# Patient Record
Sex: FEMALE | Race: WHITE | NOT HISPANIC OR LATINO
[De-identification: names, ages, dates, MRNs, and addresses within clinical notes are randomized per-mention and may not be internally consistent; named-entity substitution may affect disease eponyms.]

---

## 2022-11-02 ENCOUNTER — NON-APPOINTMENT (OUTPATIENT)
Age: 52
End: 2022-11-02

## 2023-05-06 ENCOUNTER — INPATIENT (INPATIENT)
Facility: HOSPITAL | Age: 53
LOS: 2 days | Discharge: HOME | DRG: 423 | End: 2023-05-09
Attending: INTERNAL MEDICINE | Admitting: INTERNAL MEDICINE
Payer: COMMERCIAL

## 2023-05-06 VITALS
RESPIRATION RATE: 20 BRPM | DIASTOLIC BLOOD PRESSURE: 69 MMHG | OXYGEN SATURATION: 99 % | HEART RATE: 84 BPM | HEIGHT: 67 IN | TEMPERATURE: 98 F | SYSTOLIC BLOOD PRESSURE: 140 MMHG | WEIGHT: 190.04 LBS

## 2023-05-06 DIAGNOSIS — K80.50 CALCULUS OF BILE DUCT WITHOUT CHOLANGITIS OR CHOLECYSTITIS WITHOUT OBSTRUCTION: ICD-10-CM

## 2023-05-06 LAB
ALBUMIN SERPL ELPH-MCNC: 3.8 G/DL — SIGNIFICANT CHANGE UP (ref 3.5–5.2)
ALP SERPL-CCNC: 247 U/L — HIGH (ref 30–115)
ALT FLD-CCNC: 555 U/L — HIGH (ref 0–41)
ANION GAP SERPL CALC-SCNC: 9 MMOL/L — SIGNIFICANT CHANGE UP (ref 7–14)
APTT BLD: 36 SEC — SIGNIFICANT CHANGE UP (ref 27–39.2)
AST SERPL-CCNC: 428 U/L — HIGH (ref 0–41)
BASOPHILS # BLD AUTO: 0.03 K/UL — SIGNIFICANT CHANGE UP (ref 0–0.2)
BASOPHILS NFR BLD AUTO: 0.4 % — SIGNIFICANT CHANGE UP (ref 0–1)
BILIRUB DIRECT SERPL-MCNC: 0.5 MG/DL — HIGH (ref 0–0.3)
BILIRUB INDIRECT FLD-MCNC: 0.5 MG/DL — SIGNIFICANT CHANGE UP (ref 0.2–1.2)
BILIRUB SERPL-MCNC: 1 MG/DL — SIGNIFICANT CHANGE UP (ref 0.2–1.2)
BUN SERPL-MCNC: 16 MG/DL — SIGNIFICANT CHANGE UP (ref 10–20)
CALCIUM SERPL-MCNC: 9.3 MG/DL — SIGNIFICANT CHANGE UP (ref 8.4–10.5)
CHLORIDE SERPL-SCNC: 105 MMOL/L — SIGNIFICANT CHANGE UP (ref 98–110)
CO2 SERPL-SCNC: 25 MMOL/L — SIGNIFICANT CHANGE UP (ref 17–32)
CREAT SERPL-MCNC: 0.7 MG/DL — SIGNIFICANT CHANGE UP (ref 0.7–1.5)
EGFR: 104 ML/MIN/1.73M2 — SIGNIFICANT CHANGE UP
EOSINOPHIL # BLD AUTO: 0.03 K/UL — SIGNIFICANT CHANGE UP (ref 0–0.7)
EOSINOPHIL NFR BLD AUTO: 0.4 % — SIGNIFICANT CHANGE UP (ref 0–8)
GLUCOSE BLDC GLUCOMTR-MCNC: 96 MG/DL — SIGNIFICANT CHANGE UP (ref 70–99)
GLUCOSE SERPL-MCNC: 254 MG/DL — HIGH (ref 70–99)
HCG SERPL QL: NEGATIVE — SIGNIFICANT CHANGE UP
HCT VFR BLD CALC: 42.5 % — SIGNIFICANT CHANGE UP (ref 37–47)
HGB BLD-MCNC: 14 G/DL — SIGNIFICANT CHANGE UP (ref 12–16)
IMM GRANULOCYTES NFR BLD AUTO: 0.4 % — HIGH (ref 0.1–0.3)
INR BLD: 1.11 RATIO — SIGNIFICANT CHANGE UP (ref 0.65–1.3)
LIDOCAIN IGE QN: 86 U/L — HIGH (ref 7–60)
LYMPHOCYTES # BLD AUTO: 1.77 K/UL — SIGNIFICANT CHANGE UP (ref 1.2–3.4)
LYMPHOCYTES # BLD AUTO: 22 % — SIGNIFICANT CHANGE UP (ref 20.5–51.1)
MCHC RBC-ENTMCNC: 30.6 PG — SIGNIFICANT CHANGE UP (ref 27–31)
MCHC RBC-ENTMCNC: 32.9 G/DL — SIGNIFICANT CHANGE UP (ref 32–37)
MCV RBC AUTO: 92.8 FL — SIGNIFICANT CHANGE UP (ref 81–99)
MONOCYTES # BLD AUTO: 0.7 K/UL — HIGH (ref 0.1–0.6)
MONOCYTES NFR BLD AUTO: 8.7 % — SIGNIFICANT CHANGE UP (ref 1.7–9.3)
NEUTROPHILS # BLD AUTO: 5.49 K/UL — SIGNIFICANT CHANGE UP (ref 1.4–6.5)
NEUTROPHILS NFR BLD AUTO: 68.1 % — SIGNIFICANT CHANGE UP (ref 42.2–75.2)
NRBC # BLD: 0 /100 WBCS — SIGNIFICANT CHANGE UP (ref 0–0)
PLATELET # BLD AUTO: 303 K/UL — SIGNIFICANT CHANGE UP (ref 130–400)
PMV BLD: 11.1 FL — HIGH (ref 7.4–10.4)
POTASSIUM SERPL-MCNC: 3.5 MMOL/L — SIGNIFICANT CHANGE UP (ref 3.5–5)
POTASSIUM SERPL-SCNC: 3.5 MMOL/L — SIGNIFICANT CHANGE UP (ref 3.5–5)
PROT SERPL-MCNC: 7.6 G/DL — SIGNIFICANT CHANGE UP (ref 6–8)
PROTHROM AB SERPL-ACNC: 12.7 SEC — SIGNIFICANT CHANGE UP (ref 9.95–12.87)
RBC # BLD: 4.58 M/UL — SIGNIFICANT CHANGE UP (ref 4.2–5.4)
RBC # FLD: 13.8 % — SIGNIFICANT CHANGE UP (ref 11.5–14.5)
SODIUM SERPL-SCNC: 139 MMOL/L — SIGNIFICANT CHANGE UP (ref 135–146)
WBC # BLD: 8.05 K/UL — SIGNIFICANT CHANGE UP (ref 4.8–10.8)
WBC # FLD AUTO: 8.05 K/UL — SIGNIFICANT CHANGE UP (ref 4.8–10.8)

## 2023-05-06 PROCEDURE — 99285 EMERGENCY DEPT VISIT HI MDM: CPT

## 2023-05-06 PROCEDURE — 80076 HEPATIC FUNCTION PANEL: CPT

## 2023-05-06 PROCEDURE — 85025 COMPLETE CBC W/AUTO DIFF WBC: CPT

## 2023-05-06 PROCEDURE — 88312 SPECIAL STAINS GROUP 1: CPT

## 2023-05-06 PROCEDURE — 86901 BLOOD TYPING SEROLOGIC RH(D): CPT

## 2023-05-06 PROCEDURE — 80048 BASIC METABOLIC PNL TOTAL CA: CPT

## 2023-05-06 PROCEDURE — 85730 THROMBOPLASTIN TIME PARTIAL: CPT

## 2023-05-06 PROCEDURE — 88184 FLOWCYTOMETRY/ TC 1 MARKER: CPT

## 2023-05-06 PROCEDURE — 88341 IMHCHEM/IMCYTCHM EA ADD ANTB: CPT

## 2023-05-06 PROCEDURE — 86850 RBC ANTIBODY SCREEN: CPT

## 2023-05-06 PROCEDURE — 88342 IMHCHEM/IMCYTCHM 1ST ANTB: CPT

## 2023-05-06 PROCEDURE — 88305 TISSUE EXAM BY PATHOLOGIST: CPT

## 2023-05-06 PROCEDURE — 80053 COMPREHEN METABOLIC PANEL: CPT

## 2023-05-06 PROCEDURE — 88172 CYTP DX EVAL FNA 1ST EA SITE: CPT

## 2023-05-06 PROCEDURE — C9113: CPT

## 2023-05-06 PROCEDURE — 76705 ECHO EXAM OF ABDOMEN: CPT | Mod: 26

## 2023-05-06 PROCEDURE — 87205 SMEAR GRAM STAIN: CPT

## 2023-05-06 PROCEDURE — 83036 HEMOGLOBIN GLYCOSYLATED A1C: CPT

## 2023-05-06 PROCEDURE — 99223 1ST HOSP IP/OBS HIGH 75: CPT

## 2023-05-06 PROCEDURE — 88173 CYTOPATH EVAL FNA REPORT: CPT

## 2023-05-06 PROCEDURE — 85027 COMPLETE CBC AUTOMATED: CPT

## 2023-05-06 PROCEDURE — 84100 ASSAY OF PHOSPHORUS: CPT

## 2023-05-06 PROCEDURE — 83735 ASSAY OF MAGNESIUM: CPT

## 2023-05-06 PROCEDURE — 88185 FLOWCYTOMETRY/TC ADD-ON: CPT

## 2023-05-06 PROCEDURE — 86900 BLOOD TYPING SEROLOGIC ABO: CPT

## 2023-05-06 PROCEDURE — 82962 GLUCOSE BLOOD TEST: CPT

## 2023-05-06 PROCEDURE — 36415 COLL VENOUS BLD VENIPUNCTURE: CPT

## 2023-05-06 PROCEDURE — 81025 URINE PREGNANCY TEST: CPT

## 2023-05-06 PROCEDURE — 85610 PROTHROMBIN TIME: CPT

## 2023-05-06 PROCEDURE — 74177 CT ABD & PELVIS W/CONTRAST: CPT | Mod: 26,MA

## 2023-05-06 RX ORDER — RANOLAZINE 500 MG/1
500 TABLET, FILM COATED, EXTENDED RELEASE ORAL
Refills: 0 | Status: DISCONTINUED | OUTPATIENT
Start: 2023-05-06 | End: 2023-05-09

## 2023-05-06 RX ORDER — GLUCAGON INJECTION, SOLUTION 0.5 MG/.1ML
1 INJECTION, SOLUTION SUBCUTANEOUS ONCE
Refills: 0 | Status: DISCONTINUED | OUTPATIENT
Start: 2023-05-06 | End: 2023-05-09

## 2023-05-06 RX ORDER — FAMOTIDINE 10 MG/ML
20 INJECTION INTRAVENOUS ONCE
Refills: 0 | Status: COMPLETED | OUTPATIENT
Start: 2023-05-06 | End: 2023-05-06

## 2023-05-06 RX ORDER — CHLORHEXIDINE GLUCONATE 213 G/1000ML
1 SOLUTION TOPICAL
Refills: 0 | Status: DISCONTINUED | OUTPATIENT
Start: 2023-05-06 | End: 2023-05-09

## 2023-05-06 RX ORDER — SODIUM CHLORIDE 9 MG/ML
1000 INJECTION INTRAMUSCULAR; INTRAVENOUS; SUBCUTANEOUS ONCE
Refills: 0 | Status: COMPLETED | OUTPATIENT
Start: 2023-05-06 | End: 2023-05-06

## 2023-05-06 RX ORDER — AMLODIPINE BESYLATE 2.5 MG/1
10 TABLET ORAL AT BEDTIME
Refills: 0 | Status: DISCONTINUED | OUTPATIENT
Start: 2023-05-06 | End: 2023-05-08

## 2023-05-06 RX ORDER — ONDANSETRON 8 MG/1
4 TABLET, FILM COATED ORAL EVERY 8 HOURS
Refills: 0 | Status: DISCONTINUED | OUTPATIENT
Start: 2023-05-06 | End: 2023-05-09

## 2023-05-06 RX ORDER — SODIUM CHLORIDE 9 MG/ML
1000 INJECTION, SOLUTION INTRAVENOUS
Refills: 0 | Status: DISCONTINUED | OUTPATIENT
Start: 2023-05-06 | End: 2023-05-09

## 2023-05-06 RX ORDER — CEFEPIME 1 G/1
2000 INJECTION, POWDER, FOR SOLUTION INTRAMUSCULAR; INTRAVENOUS EVERY 12 HOURS
Refills: 0 | Status: DISCONTINUED | OUTPATIENT
Start: 2023-05-06 | End: 2023-05-09

## 2023-05-06 RX ORDER — CEFOTETAN DISODIUM 1 G
1 VIAL (EA) INJECTION ONCE
Refills: 0 | Status: COMPLETED | OUTPATIENT
Start: 2023-05-06 | End: 2023-05-06

## 2023-05-06 RX ORDER — ONDANSETRON 8 MG/1
4 TABLET, FILM COATED ORAL ONCE
Refills: 0 | Status: COMPLETED | OUTPATIENT
Start: 2023-05-06 | End: 2023-05-06

## 2023-05-06 RX ORDER — LANOLIN ALCOHOL/MO/W.PET/CERES
3 CREAM (GRAM) TOPICAL AT BEDTIME
Refills: 0 | Status: DISCONTINUED | OUTPATIENT
Start: 2023-05-06 | End: 2023-05-09

## 2023-05-06 RX ORDER — ACETAMINOPHEN 500 MG
650 TABLET ORAL EVERY 6 HOURS
Refills: 0 | Status: DISCONTINUED | OUTPATIENT
Start: 2023-05-06 | End: 2023-05-09

## 2023-05-06 RX ORDER — PANTOPRAZOLE SODIUM 20 MG/1
40 TABLET, DELAYED RELEASE ORAL DAILY
Refills: 0 | Status: DISCONTINUED | OUTPATIENT
Start: 2023-05-06 | End: 2023-05-08

## 2023-05-06 RX ORDER — ATORVASTATIN CALCIUM 80 MG/1
40 TABLET, FILM COATED ORAL AT BEDTIME
Refills: 0 | Status: DISCONTINUED | OUTPATIENT
Start: 2023-05-06 | End: 2023-05-09

## 2023-05-06 RX ORDER — METRONIDAZOLE 500 MG
500 TABLET ORAL EVERY 8 HOURS
Refills: 0 | Status: DISCONTINUED | OUTPATIENT
Start: 2023-05-06 | End: 2023-05-09

## 2023-05-06 RX ORDER — DIPHENHYDRAMINE HYDROCHLORIDE AND LIDOCAINE HYDROCHLORIDE AND ALUMINUM HYDROXIDE AND MAGNESIUM HYDRO
30 KIT ONCE
Refills: 0 | Status: COMPLETED | OUTPATIENT
Start: 2023-05-06 | End: 2023-05-06

## 2023-05-06 RX ORDER — LOSARTAN POTASSIUM 100 MG/1
100 TABLET, FILM COATED ORAL DAILY
Refills: 0 | Status: DISCONTINUED | OUTPATIENT
Start: 2023-05-06 | End: 2023-05-08

## 2023-05-06 RX ORDER — INSULIN LISPRO 100/ML
VIAL (ML) SUBCUTANEOUS
Refills: 0 | Status: DISCONTINUED | OUTPATIENT
Start: 2023-05-06 | End: 2023-05-09

## 2023-05-06 RX ORDER — DEXTROSE 50 % IN WATER 50 %
25 SYRINGE (ML) INTRAVENOUS ONCE
Refills: 0 | Status: DISCONTINUED | OUTPATIENT
Start: 2023-05-06 | End: 2023-05-09

## 2023-05-06 RX ORDER — DEXTROSE 50 % IN WATER 50 %
15 SYRINGE (ML) INTRAVENOUS ONCE
Refills: 0 | Status: DISCONTINUED | OUTPATIENT
Start: 2023-05-06 | End: 2023-05-09

## 2023-05-06 RX ADMIN — SODIUM CHLORIDE 1000 MILLILITER(S): 9 INJECTION INTRAMUSCULAR; INTRAVENOUS; SUBCUTANEOUS at 13:00

## 2023-05-06 RX ADMIN — DIPHENHYDRAMINE HYDROCHLORIDE AND LIDOCAINE HYDROCHLORIDE AND ALUMINUM HYDROXIDE AND MAGNESIUM HYDRO 30 MILLILITER(S): KIT at 12:59

## 2023-05-06 RX ADMIN — AMLODIPINE BESYLATE 10 MILLIGRAM(S): 2.5 TABLET ORAL at 21:46

## 2023-05-06 RX ADMIN — FAMOTIDINE 100 MILLIGRAM(S): 10 INJECTION INTRAVENOUS at 13:01

## 2023-05-06 RX ADMIN — ATORVASTATIN CALCIUM 40 MILLIGRAM(S): 80 TABLET, FILM COATED ORAL at 21:46

## 2023-05-06 RX ADMIN — Medication 100 GRAM(S): at 16:57

## 2023-05-06 RX ADMIN — ONDANSETRON 4 MILLIGRAM(S): 8 TABLET, FILM COATED ORAL at 13:01

## 2023-05-06 RX ADMIN — RANOLAZINE 500 MILLIGRAM(S): 500 TABLET, FILM COATED, EXTENDED RELEASE ORAL at 21:46

## 2023-05-06 RX ADMIN — Medication 100 MILLIGRAM(S): at 21:46

## 2023-05-06 NOTE — CONSULT NOTE ADULT - SUBJECTIVE AND OBJECTIVE BOX
53 y/o F PMHx DM, HTN, GERD c/o 1 week h/o intermittent upper mid-abdominal pain and intermittent episodes of vomiting. First episode of vomiting was a week ago with small amount observed per pt; last episode of vomiting was this morning on an empty stomach.  Pt denies chest pain, fever, SOB, recent foreign travels, lightheadedness, dysuria, hematuria,  bloody stools    currently py denies any abd pain   never had gb attack   never had egd or colonoscopy     LBM this am and nl   no current gi doctor       Allergies and Intolerances:   No Known Allergies:     Home Medications:   · 	HYDROCHLOROT 25MG TAB: Last Dose Taken:    · 	LOSARTAN 100MG TAB: Last Dose Taken:    · 	PANTOPRAZOLE 40MG DR TAB: Last Dose Taken:    · 	AMLOD/ATORVA 10-40MG TAB: Last Dose Taken:    · 	RANOLAZINE 500MG ER TAB: Last Dose Taken:    · 	JANUMET 50-1,000 MG TABLET: Last Dose Taken:  , TAKE 1 TABLET BY MOUTH EVERY DAY  · 	TRESIBA FLEX 100U/ML PEN: Last Dose Taken:    · 	JARDIANCE 25 MG TABLET: Last Dose Taken:  , TAKE 1 TABLET BY MOUTH EVERY DAY  · 	TRULICITY 1.5 MG/0.5 ML PEN: Last Dose Taken:  , INJECT 0.5 ML SUBCUTANEOUSLY ONE TIME PER WEEK    Patient History:     Diabetes     GERD (gastroesophageal reflux disease)     Hypertension.     PAST SURGICAL HISTORY:  No significant past surgical history.        Social History:  pt denies h/o smoking  pt denies h/o alcohol use  pt denies h/o illicit drug use      Physical Exam: T(C): 36.9 (05-06-23 @ 12:34), Max: 36.9 (05-06-23 @ 12:34)  HR: 84 (05-06-23 @ 12:34) (84 - 84)  BP: 140/69 (05-06-23 @ 12:34) (140/69 - 140/69)  RR: 20 (05-06-23 @ 12:34) (20 - 20)  SpO2: 99% (05-06-23 @ 12:34) (99% - 99%)    PHYSICAL EXAM:  GENERAL: laying in bed, appearing comfortable and in NAD; non-toxic appearing  HEENT: Moist mucous membranes  NECK: Supple  CHEST/LUNG: even respirations b/l; no accessory muscle use  ABDOMEN: Soft, Nontender, Nondistended no rt   EXTREMITIES:   No calf TTP b/l  SKIN: warm  Neuro: A&Ox4       Labs and Results:  Labs, Radiology, Cardiology, and Other Results: 14.0   8.05  )-----------( 303      ( 06 May 2023 13:04 )             42.5       05-06    139  |  105  |  16  ----------------------------<  254<H>  3.5   |  25  |  0.7    Ca    9.3      06 May 2023 13:04    TPro  7.6  /  Alb  3.8  /  TBili  1.0  /  DBili  0.5<H>  /  AST  428<H>  /  ALT  555<H>  /  AlkPhos  247<H>  05-06            PT/INR - ( 06 May 2023 13:04 )   PT: 12.70 sec;   INR: 1.11 ratio         PTT - ( 06 May 2023 13:04 )  PTT:36.0 sec    EXAM:  CT ABDOMEN AND PELVIS PROCEDURE DATE:  05/06/2023      FINDINGS:  HEPATOBILIARY: Mild intra and extrahepatic biliary ductal dilatation,   with CBD measuring up to 1 cm and demonstrates an abrupt change of   caliber at its distal end. No definite choledocholithiasis identified.   Nonspecific gallbladder wall edema. Cholelithiasis. Right hepatic   hypodensity too small to characterize..    KIDNEYS: Symmetric enhancement. No hydronephrosis. Bilateral parapelvic   cysts. Nonobstructing 9 mm right lower pole calculus.    BONES/SOFT TISSUES: Degenerative changes of the spine. Small   fat-containing umbilical hernia.    IMPRESSION:  Mild intra and extrahepatic biliary ductal dilatation, with CBD measuring   up to 1 cm and demonstrates an abrupt change of caliber at its distal   end. No definite choledocholithiasis identified. Recommend MRCP for   better evaluation of the biliary tree.    Gallbladder wall edema, gallstone and inflammatory changes in the   gallbladder fossa suggesting acute cholecystitis. Confirmation with   abdominal ultrasound suggested.    --- End of Report ---    DORCAS KENNEY MD; Attending Radiologist  This document has been electronically signed. May  6 2023  3:57PM    US ABDOMEN RT UPR QUADRANT     PROCEDURE DATE:  05/06/2023      FINDINGS:  LIVER: Within normal limits.  BILE DUCTS: Mild intrahepatic biliary ductal dilatation. Common bile duct   measures 6 mm.  GALLBLADDER: Multiple gallstones, the largest measuring up to 0.7 x 0.7   cm. Gallbladder wall thickening up to 6 mm. Trace pericholecystic fluid.   Negative sonographic Coronel's sign.  PANCREAS: Visualized portions are within normal limits.  RIGHT KIDNEY: 12 cm. No hydronephrosis.  ASCITES: None.  IVC: Visualized portions are within normal limits.      IMPRESSION:  Gallstones and wall thickening with trace pericholecystic fluid. Reported   negative sonographic Coronel's sign. Findings can represent cholecystitis   in appropriate clinical setting.    --- End of Report --    DINESH NEWBY MD; Attending Radiologist  This document has been electronically signed. May  6 2023  4:36PM

## 2023-05-06 NOTE — H&P ADULT - HISTORY OF PRESENT ILLNESS
53 y/o F PMHx DM, HTN, GERD c/o 1 week h/o intermittent upper mid-abdominal pain and intermittent episodes of vomiting. First episode of vomiting was a week ago with small amount observed per pt; last episode of vomiting was this morning on an empty stomach.  Pt denies chest pain, fever, SOB, recent foreign travels, lightheadedness, dysuria, hematuria,  bloody stools   53 y/o F PMHx DM, HTN, GERD c/o 1 week h/o intermittent upper mid-abdominal pain and intermittent episodes of vomiting. First episode of vomiting was a week ago with small amount observed per pt; last episode of vomiting was this morning on an empty stomach.  Pt denies chest pain, fever, SOB, recent foreign travels, lightheadedness, dysuria, hematuria, bloody stools

## 2023-05-06 NOTE — H&P ADULT - NSHPLABSRESULTS_GEN_ALL_CORE
14.0   8.05  )-----------( 303      ( 06 May 2023 13:04 )             42.5       05-06    139  |  105  |  16  ----------------------------<  254<H>  3.5   |  25  |  0.7    Ca    9.3      06 May 2023 13:04    TPro  7.6  /  Alb  3.8  /  TBili  1.0  /  DBili  0.5<H>  /  AST  428<H>  /  ALT  555<H>  /  AlkPhos  247<H>  05-06                  PT/INR - ( 06 May 2023 13:04 )   PT: 12.70 sec;   INR: 1.11 ratio         PTT - ( 06 May 2023 13:04 )  PTT:36.0 sec          CAPILLARY BLOOD GLUCOSE 14.0   8.05  )-----------( 303      ( 06 May 2023 13:04 )             42.5       05-06    139  |  105  |  16  ----------------------------<  254<H>  3.5   |  25  |  0.7    Ca    9.3      06 May 2023 13:04    TPro  7.6  /  Alb  3.8  /  TBili  1.0  /  DBili  0.5<H>  /  AST  428<H>  /  ALT  555<H>  /  AlkPhos  247<H>  05-06            PT/INR - ( 06 May 2023 13:04 )   PT: 12.70 sec;   INR: 1.11 ratio         PTT - ( 06 May 2023 13:04 )  PTT:36.0 sec    EXAM:  CT ABDOMEN AND PELVIS PROCEDURE DATE:  05/06/2023      FINDINGS:  HEPATOBILIARY: Mild intra and extrahepatic biliary ductal dilatation,   with CBD measuring up to 1 cm and demonstrates an abrupt change of   caliber at its distal end. No definite choledocholithiasis identified.   Nonspecific gallbladder wall edema. Cholelithiasis. Right hepatic   hypodensity too small to characterize..    KIDNEYS: Symmetric enhancement. No hydronephrosis. Bilateral parapelvic   cysts. Nonobstructing 9 mm right lower pole calculus.    BONES/SOFT TISSUES: Degenerative changes of the spine. Small   fat-containing umbilical hernia.    IMPRESSION:  Mild intra and extrahepatic biliary ductal dilatation, with CBD measuring   up to 1 cm and demonstrates an abrupt change of caliber at its distal   end. No definite choledocholithiasis identified. Recommend MRCP for   better evaluation of the biliary tree.    Gallbladder wall edema, gallstone and inflammatory changes in the   gallbladder fossa suggesting acute cholecystitis. Confirmation with   abdominal ultrasound suggested.    --- End of Report ---    DORCAS KENNEY MD; Attending Radiologist  This document has been electronically signed. May  6 2023  3:57PM    US ABDOMEN RT UPR QUADRANT     PROCEDURE DATE:  05/06/2023      FINDINGS:  LIVER: Within normal limits.  BILE DUCTS: Mild intrahepatic biliary ductal dilatation. Common bile duct   measures 6 mm.  GALLBLADDER: Multiple gallstones, the largest measuring up to 0.7 x 0.7   cm. Gallbladder wall thickening up to 6 mm. Trace pericholecystic fluid.   Negative sonographic Coronel's sign.  PANCREAS: Visualized portions are within normal limits.  RIGHT KIDNEY: 12 cm. No hydronephrosis.  ASCITES: None.  IVC: Visualized portions are within normal limits.      IMPRESSION:  Gallstones and wall thickening with trace pericholecystic fluid. Reported   negative sonographic Coronel's sign. Findings can represent cholecystitis   in appropriate clinical setting.    --- End of Report --    DINESH NEWBY MD; Attending Radiologist  This document has been electronically signed. May  6 2023  4:36PM

## 2023-05-06 NOTE — ED PROVIDER NOTE - OBJECTIVE STATEMENT
52-year-old female past medical history of diabetes, hypertension, GERD presents for evaluation of vomiting.  Patient states she has been having episodes of indigestion over the past week with intermittent episodes of vomiting.  First episode a week ago and she noticed small amount of blood, second episode a couple days ago after eating and vomiting just included food contents, this morning with his most recent episode on an empty stomach.  She noted blood again.  Now presents with mild burning epigastric pain, no inciting or relieving factors.  Denies fever, headache, chest pain, shortness of breath, lightheadedness, dysuria, hematuria, dark or bloody stools.

## 2023-05-06 NOTE — ED PROVIDER NOTE - ATTENDING APP SHARED VISIT CONTRIBUTION OF CARE
Patient comes in for epigastric discomfort associated with some vomiting which today she saw little blood in.  Denies any fever or diarrhea.    Exam: Soft nontender nondistended abdomen, normal skin, no acute distress  Plan: Labs, antiemetic, p.o. challenge

## 2023-05-06 NOTE — H&P ADULT - ASSESSMENT
53 y/o F PMHx DM, HTN, GERD c/o 1 week h/o intermittent upper mid-abdominal pain and intermittent episodes of vomiting. First episode of vomiting was a week ago with small amount observed per pt; last episode of vomiting was this morning on an empty stomach.Pt denies chest pain, fever, SOB, recent foreign travels, lightheadedness, dysuria, hematuria, dark or bloody stools. In ED, labs revealed elevated LFTS, including elevated lipase and d. bili. Imaging demonstrated findings consistent w/ cholecystitis     Plan   Admit and transfer to [place]  #cholecystitis   #Gallstones and wall thickening with trace pericholecystic fluid on US  # elevated LFTs  -s/p 1 dose cefotetan in ED   - GI consult       HTN    #DM  - hold oral meds  -ISS  53 y/o F PMHx DM, HTN, GERD c/o 1 week h/o intermittent upper mid-abdominal pain and intermittent episodes of vomiting. First episode of vomiting was a week ago with small amount observed per pt; last episode of vomiting was this morning on an empty stomach.Pt denies chest pain, fever, SOB, recent foreign travels, lightheadedness, dysuria, hematuria, dark or bloody stools. In ED, labs revealed elevated LFTS, including elevated lipase and d. bili. Imaging demonstrated findings consistent w/ cholecystitis     Plan   Admit and transfer to [place]  #cholecystitis   #Gallstones and wall thickening with trace pericholecystic fluid on US  # elevated LFTs  -s/p 1 dose cefotetan in ED   - GI consult   - cardiology consult for clearance ( pt states she was placed on Ranexa 2 yrs ago after going to hospital for chest pain, but reports cardiac workup was negative, but her cardiologist Dr. Sullivan advised her to c/w Ranexa)    #HTN  -c/w home meds    #DM  - hold oral meds  -ISS       pt d/w Dr. Espinal 53 y/o F PMHx DM, HTN, GERD c/o 1 week h/o intermittent upper mid-abdominal pain and intermittent episodes of vomiting. First episode of vomiting was a week ago with small amount observed per pt; last episode of vomiting was this morning on an empty stomach. Pt reports abd pain is 1-2/10 when sxs occur (no known triggers). Pt denies chest pain, fever, SOB, recent foreign travels, lightheadedness, dysuria, hematuria, dark or bloody stools. In ED, labs revealed elevated LFTS, including elevated lipase and d. bili. Imaging demonstrated findings consistent w/ cholecystitis     Plan   Admit and transfer to [place]  #cholecystitis   #Gallstones and wall thickening with trace pericholecystic fluid seen on US  # elevated LFTs  - s/p 1 dose cefotetan in ED   - IV start cefepime and flagyl  - CLD  - GI consult for ERCP +/- MRCP  - cardiology consult for clearance ( pt states she was placed on Ranexa 2 yrs ago after going to hospital for chest pain, but reports cardiac workup was negative, but her cardiologist Dr. Sullivan advised her to c/w Ranexa)  - trend LFTs, lipase  - f/u blood CX    #HTN  -c/w home meds    #DM  - hold oral meds  -ISS     #GERD  - c/w pantoprazole          pt d/w Dr. Espinal 51 y/o F PMHx DM, HTN, GERD c/o 1 week h/o intermittent upper mid-abdominal pain and intermittent episodes of vomiting. First episode of vomiting was a week ago with small amount observed per pt; last episode of vomiting was this morning on an empty stomach. Pt reports abd pain is 1-2/10 when sxs occur (no known triggers). Pt denies chest pain, fever, SOB, recent foreign travels, lightheadedness, dysuria, hematuria, dark or bloody stools. In ED, labs revealed elevated LFTS, including elevated lipase and d. bili. Imaging demonstrated findings consistent w/ cholecystitis     Plan   Admit and transfer to [place]  #cholecystitis   #Gallstones and wall thickening with trace pericholecystic fluid seen on US  # elevated LFTs  - s/p 1 dose cefotetan in ED   - IV start cefepime and flagyl  - CLD  - GI consult for ERCP +/- MRCP  - cardiology consult for clearance ( pt states Ranexa started 2 yrs ago after going to hospital for chest pain, but reports cardiac workup was negative, but her cardiologist Dr. Sullivan advised her to c/w Ranexa)  - trend LFTs, lipase  - f/u blood Cx    #HTN  -c/w home meds    #DM  - hold meds   -ISS     #GERD  - c/w pantoprazole          pt d/w Dr. Espinal 53 y/o F PMHx DM, HTN, GERD c/o 1 week h/o intermittent upper mid-abdominal pain and intermittent episodes of vomiting. First episode of vomiting was a week ago with small amount observed per pt; last episode of vomiting was this morning on an empty stomach. Pt reports abd pain is 1-2/10 when sxs occur (no known triggers). Pt denies chest pain, fever, SOB, recent foreign travels, lightheadedness, dysuria, hematuria, dark or bloody stools. In ED, labs revealed elevated LFTS, including elevated lipase and d. bili. Imaging demonstrated findings consistent w/ cholecystitis     Plan   Admit to [place] from  ED  #cholecystitis   #Gallstones and wall thickening with trace pericholecystic fluid seen on US, CBD dilation 1 cm on CT ab  # elevated LFTs  - s/p 1 dose cefotetan in ED   - IV start cefepime and flagyl  - CLD  - GI consulted in ER at , very likely ERCP this Monday  - trend LFT  - f/u blood Cx    #HTN  -c/w home meds    #DM  - hold PO meds   -ISS     #GERD  - c/w pantoprazole        To Follow:  Need to order AM labs once arrives at [place], discuss with GI Dr. Kumari.    Of note, was placed on Ranexa by cardiologist; neg cardiac work up 2 years ago per patient and was told by cardiologist to continue Ranexa.  Currently asymptomatic from cardiopulmonary standpoint but would get collateral infro from Dr. Collado. 53 y/o F PMHx DM, HTN, GERD c/o 1 week h/o intermittent upper mid-abdominal pain and intermittent episodes of vomiting. First episode of vomiting was a week ago with small amount observed per pt; last episode of vomiting was this morning on an empty stomach. Pt reports abd pain is 1-2/10 when sxs occur (no known triggers). Pt denies chest pain, fever, SOB, recent foreign travels, lightheadedness, dysuria, hematuria, dark or bloody stools. In ED, labs revealed elevated LFTS, including elevated lipase and d. bili. Imaging demonstrated findings consistent w/ cholecystitis     Plan   Admit to [place] from  ED  #cholecystitis   #Gallstones and wall thickening with trace pericholecystic fluid seen on US, CBD dilation 1 cm on CT ab  # elevated LFTs  - s/p 1 dose cefotetan in ED   - start IV cefepime and flagyl  - CLD  - GI consulted in ER at , very likely ERCP this Monday  - trend LFT  - f/u blood Cx    #HTN  -c/w home meds    #DM  - hold PO meds   -ISS     #GERD  - c/w pantoprazole        To Follow:  Need to order AM labs once arrives at [place], discuss with GI Dr. Kumari.    Of note, was placed on Ranexa by cardiologist; neg cardiac work up 2 years ago per patient and was told by cardiologist to continue Ranexa.  Currently asymptomatic from cardiopulmonary standpoint but would get collateral infro from Dr. Collado.

## 2023-05-06 NOTE — ED ADULT NURSE REASSESSMENT NOTE - NS ED NURSE REASSESS COMMENT FT1
[facility] arrived for transport to [place] inpatient unit. Safety and comfort maintained.

## 2023-05-06 NOTE — ED PROVIDER NOTE - PROGRESS NOTE DETAILS
CT reviewed - benign exam persists - well appearing pt - given DM and transaminitis will get RUQ US and Surgery consult.  Surgery ACP aware. Case d/w GI /Brayden Marsh - plan for EUS/ERCP Monday.  D/w Hospitalist Dr. Espinal.

## 2023-05-06 NOTE — H&P ADULT - NSHPPHYSICALEXAM_GEN_ALL_CORE
T(C): 36.9 (05-06-23 @ 12:34), Max: 36.9 (05-06-23 @ 12:34)  HR: 84 (05-06-23 @ 12:34) (84 - 84)  BP: 140/69 (05-06-23 @ 12:34) (140/69 - 140/69)  RR: 20 (05-06-23 @ 12:34) (20 - 20)  SpO2: 99% (05-06-23 @ 12:34) (99% - 99%)    PHYSICAL EXAM:  GENERAL: laying in bed, appearing comfortable and in NAD  HEENT: Moist mucous membranes  NECK: Supple  CHEST/LUNG: even respirations b/l; no accessory muscle use  ABDOMEN: Soft, Nontender, Nondistended  EXTREMITIES:   No calf TTP b/l  SKIN: warm  Neuro: A&Ox4 T(C): 36.9 (05-06-23 @ 12:34), Max: 36.9 (05-06-23 @ 12:34)  HR: 84 (05-06-23 @ 12:34) (84 - 84)  BP: 140/69 (05-06-23 @ 12:34) (140/69 - 140/69)  RR: 20 (05-06-23 @ 12:34) (20 - 20)  SpO2: 99% (05-06-23 @ 12:34) (99% - 99%)    PHYSICAL EXAM:  GENERAL: laying in bed, appearing comfortable and in NAD; non-toxic appearing  HEENT: Moist mucous membranes  NECK: Supple  CHEST/LUNG: even respirations b/l; no accessory muscle use  ABDOMEN: Soft, Nontender, Nondistended  EXTREMITIES:   No calf TTP b/l  SKIN: warm  Neuro: A&Ox4

## 2023-05-07 LAB
ALBUMIN SERPL ELPH-MCNC: 3.3 G/DL — LOW (ref 3.5–5.2)
ALBUMIN SERPL ELPH-MCNC: 3.4 G/DL — LOW (ref 3.5–5.2)
ALP SERPL-CCNC: 168 U/L — HIGH (ref 30–115)
ALP SERPL-CCNC: 168 U/L — HIGH (ref 30–115)
ALT FLD-CCNC: 491 U/L — HIGH (ref 0–41)
ALT FLD-CCNC: 517 U/L — HIGH (ref 0–41)
ANION GAP SERPL CALC-SCNC: 11 MMOL/L — SIGNIFICANT CHANGE UP (ref 7–14)
ANION GAP SERPL CALC-SCNC: 11 MMOL/L — SIGNIFICANT CHANGE UP (ref 7–14)
AST SERPL-CCNC: 368 U/L — HIGH (ref 0–41)
AST SERPL-CCNC: 400 U/L — HIGH (ref 0–41)
BASOPHILS # BLD AUTO: 0.05 K/UL — SIGNIFICANT CHANGE UP (ref 0–0.2)
BASOPHILS NFR BLD AUTO: 0.9 % — SIGNIFICANT CHANGE UP (ref 0–1)
BILIRUB DIRECT SERPL-MCNC: 0.5 MG/DL — HIGH (ref 0–0.3)
BILIRUB INDIRECT FLD-MCNC: 0.7 MG/DL — SIGNIFICANT CHANGE UP (ref 0.2–1.2)
BILIRUB SERPL-MCNC: 1.2 MG/DL — SIGNIFICANT CHANGE UP (ref 0.2–1.2)
BILIRUB SERPL-MCNC: 1.2 MG/DL — SIGNIFICANT CHANGE UP (ref 0.2–1.2)
BUN SERPL-MCNC: 13 MG/DL — SIGNIFICANT CHANGE UP (ref 10–20)
BUN SERPL-MCNC: 14 MG/DL — SIGNIFICANT CHANGE UP (ref 10–20)
CALCIUM SERPL-MCNC: 9 MG/DL — SIGNIFICANT CHANGE UP (ref 8.4–10.5)
CALCIUM SERPL-MCNC: 9 MG/DL — SIGNIFICANT CHANGE UP (ref 8.4–10.5)
CHLORIDE SERPL-SCNC: 104 MMOL/L — SIGNIFICANT CHANGE UP (ref 98–110)
CHLORIDE SERPL-SCNC: 105 MMOL/L — SIGNIFICANT CHANGE UP (ref 98–110)
CHLORIDE SERPL-SCNC: 109 MMOL/L — SIGNIFICANT CHANGE UP (ref 98–110)
CO2 SERPL-SCNC: 23 MMOL/L — SIGNIFICANT CHANGE UP (ref 17–32)
CO2 SERPL-SCNC: 25 MMOL/L — SIGNIFICANT CHANGE UP (ref 17–32)
CREAT SERPL-MCNC: 0.7 MG/DL — SIGNIFICANT CHANGE UP (ref 0.7–1.5)
CREAT SERPL-MCNC: 0.7 MG/DL — SIGNIFICANT CHANGE UP (ref 0.7–1.5)
EGFR: 104 ML/MIN/1.73M2 — SIGNIFICANT CHANGE UP
EGFR: 104 ML/MIN/1.73M2 — SIGNIFICANT CHANGE UP
EOSINOPHIL # BLD AUTO: 0.03 K/UL — SIGNIFICANT CHANGE UP (ref 0–0.7)
EOSINOPHIL NFR BLD AUTO: 0.5 % — SIGNIFICANT CHANGE UP (ref 0–8)
GLUCOSE BLDC GLUCOMTR-MCNC: 111 MG/DL — HIGH (ref 70–99)
GLUCOSE BLDC GLUCOMTR-MCNC: 116 MG/DL — HIGH (ref 70–99)
GLUCOSE BLDC GLUCOMTR-MCNC: 132 MG/DL — HIGH (ref 70–99)
GLUCOSE BLDC GLUCOMTR-MCNC: 92 MG/DL — SIGNIFICANT CHANGE UP (ref 70–99)
GLUCOSE SERPL-MCNC: 88 MG/DL — SIGNIFICANT CHANGE UP (ref 70–99)
GLUCOSE SERPL-MCNC: 89 MG/DL — SIGNIFICANT CHANGE UP (ref 70–99)
HCT VFR BLD CALC: 37.9 % — SIGNIFICANT CHANGE UP (ref 37–47)
HCT VFR BLD CALC: 40 % — SIGNIFICANT CHANGE UP (ref 37–47)
HGB BLD-MCNC: 12.3 G/DL — SIGNIFICANT CHANGE UP (ref 12–16)
HGB BLD-MCNC: 13 G/DL — SIGNIFICANT CHANGE UP (ref 12–16)
IMM GRANULOCYTES NFR BLD AUTO: 0.2 % — SIGNIFICANT CHANGE UP (ref 0.1–0.3)
LYMPHOCYTES # BLD AUTO: 2.01 K/UL — SIGNIFICANT CHANGE UP (ref 1.2–3.4)
LYMPHOCYTES # BLD AUTO: 35.5 % — SIGNIFICANT CHANGE UP (ref 20.5–51.1)
MAGNESIUM SERPL-MCNC: 1.9 MG/DL — SIGNIFICANT CHANGE UP (ref 1.8–2.4)
MCHC RBC-ENTMCNC: 30.6 PG — SIGNIFICANT CHANGE UP (ref 27–31)
MCHC RBC-ENTMCNC: 30.6 PG — SIGNIFICANT CHANGE UP (ref 27–31)
MCHC RBC-ENTMCNC: 32.5 G/DL — SIGNIFICANT CHANGE UP (ref 32–37)
MCHC RBC-ENTMCNC: 32.5 G/DL — SIGNIFICANT CHANGE UP (ref 32–37)
MCV RBC AUTO: 94.1 FL — SIGNIFICANT CHANGE UP (ref 81–99)
MCV RBC AUTO: 94.3 FL — SIGNIFICANT CHANGE UP (ref 81–99)
MONOCYTES # BLD AUTO: 0.54 K/UL — SIGNIFICANT CHANGE UP (ref 0.1–0.6)
MONOCYTES NFR BLD AUTO: 9.5 % — HIGH (ref 1.7–9.3)
NEUTROPHILS # BLD AUTO: 3.02 K/UL — SIGNIFICANT CHANGE UP (ref 1.4–6.5)
NEUTROPHILS NFR BLD AUTO: 53.4 % — SIGNIFICANT CHANGE UP (ref 42.2–75.2)
NRBC # BLD: 0 /100 WBCS — SIGNIFICANT CHANGE UP (ref 0–0)
NRBC # BLD: 0 /100 WBCS — SIGNIFICANT CHANGE UP (ref 0–0)
PHOSPHATE SERPL-MCNC: 3.7 MG/DL — SIGNIFICANT CHANGE UP (ref 2.1–4.9)
PLATELET # BLD AUTO: 270 K/UL — SIGNIFICANT CHANGE UP (ref 130–400)
PLATELET # BLD AUTO: 278 K/UL — SIGNIFICANT CHANGE UP (ref 130–400)
PMV BLD: 11.3 FL — HIGH (ref 7.4–10.4)
PMV BLD: 11.6 FL — HIGH (ref 7.4–10.4)
POTASSIUM SERPL-MCNC: 3.4 MMOL/L — LOW (ref 3.5–5)
POTASSIUM SERPL-MCNC: 3.5 MMOL/L — SIGNIFICANT CHANGE UP (ref 3.5–5)
POTASSIUM SERPL-MCNC: 3.7 MMOL/L — SIGNIFICANT CHANGE UP (ref 3.5–5)
POTASSIUM SERPL-SCNC: 3.4 MMOL/L — LOW (ref 3.5–5)
POTASSIUM SERPL-SCNC: 3.5 MMOL/L — SIGNIFICANT CHANGE UP (ref 3.5–5)
POTASSIUM SERPL-SCNC: 3.7 MMOL/L — SIGNIFICANT CHANGE UP (ref 3.5–5)
PROT SERPL-MCNC: 6.8 G/DL — SIGNIFICANT CHANGE UP (ref 6–8)
PROT SERPL-MCNC: 7 G/DL — SIGNIFICANT CHANGE UP (ref 6–8)
RBC # BLD: 4.02 M/UL — LOW (ref 4.2–5.4)
RBC # BLD: 4.25 M/UL — SIGNIFICANT CHANGE UP (ref 4.2–5.4)
RBC # FLD: 14.3 % — SIGNIFICANT CHANGE UP (ref 11.5–14.5)
RBC # FLD: 14.3 % — SIGNIFICANT CHANGE UP (ref 11.5–14.5)
SODIUM SERPL-SCNC: 137 MMOL/L — SIGNIFICANT CHANGE UP (ref 135–146)
SODIUM SERPL-SCNC: 139 MMOL/L — SIGNIFICANT CHANGE UP (ref 135–146)
SODIUM SERPL-SCNC: 145 MMOL/L — SIGNIFICANT CHANGE UP (ref 135–146)
WBC # BLD: 5.66 K/UL — SIGNIFICANT CHANGE UP (ref 4.8–10.8)
WBC # BLD: 6.29 K/UL — SIGNIFICANT CHANGE UP (ref 4.8–10.8)
WBC # FLD AUTO: 5.66 K/UL — SIGNIFICANT CHANGE UP (ref 4.8–10.8)
WBC # FLD AUTO: 6.29 K/UL — SIGNIFICANT CHANGE UP (ref 4.8–10.8)

## 2023-05-07 PROCEDURE — 99232 SBSQ HOSP IP/OBS MODERATE 35: CPT

## 2023-05-07 PROCEDURE — 99253 IP/OBS CNSLTJ NEW/EST LOW 45: CPT

## 2023-05-07 RX ORDER — POTASSIUM CHLORIDE 20 MEQ
20 PACKET (EA) ORAL ONCE
Refills: 0 | Status: COMPLETED | OUTPATIENT
Start: 2023-05-07 | End: 2023-05-07

## 2023-05-07 RX ORDER — SODIUM CHLORIDE 9 MG/ML
1000 INJECTION, SOLUTION INTRAVENOUS
Refills: 0 | Status: DISCONTINUED | OUTPATIENT
Start: 2023-05-07 | End: 2023-05-09

## 2023-05-07 RX ADMIN — AMLODIPINE BESYLATE 10 MILLIGRAM(S): 2.5 TABLET ORAL at 21:31

## 2023-05-07 RX ADMIN — CHLORHEXIDINE GLUCONATE 1 APPLICATION(S): 213 SOLUTION TOPICAL at 05:37

## 2023-05-07 RX ADMIN — Medication 650 MILLIGRAM(S): at 10:15

## 2023-05-07 RX ADMIN — PANTOPRAZOLE SODIUM 40 MILLIGRAM(S): 20 TABLET, DELAYED RELEASE ORAL at 11:47

## 2023-05-07 RX ADMIN — CEFEPIME 100 MILLIGRAM(S): 1 INJECTION, POWDER, FOR SOLUTION INTRAMUSCULAR; INTRAVENOUS at 17:26

## 2023-05-07 RX ADMIN — CEFEPIME 100 MILLIGRAM(S): 1 INJECTION, POWDER, FOR SOLUTION INTRAMUSCULAR; INTRAVENOUS at 05:29

## 2023-05-07 RX ADMIN — Medication 100 MILLIGRAM(S): at 20:12

## 2023-05-07 RX ADMIN — Medication 50 MILLIEQUIVALENT(S): at 07:44

## 2023-05-07 RX ADMIN — RANOLAZINE 500 MILLIGRAM(S): 500 TABLET, FILM COATED, EXTENDED RELEASE ORAL at 05:32

## 2023-05-07 RX ADMIN — RANOLAZINE 500 MILLIGRAM(S): 500 TABLET, FILM COATED, EXTENDED RELEASE ORAL at 17:26

## 2023-05-07 RX ADMIN — Medication 100 MILLIGRAM(S): at 07:44

## 2023-05-07 RX ADMIN — LOSARTAN POTASSIUM 100 MILLIGRAM(S): 100 TABLET, FILM COATED ORAL at 05:32

## 2023-05-07 RX ADMIN — Medication 100 MILLIGRAM(S): at 15:06

## 2023-05-07 RX ADMIN — Medication 650 MILLIGRAM(S): at 11:00

## 2023-05-07 RX ADMIN — ATORVASTATIN CALCIUM 40 MILLIGRAM(S): 80 TABLET, FILM COATED ORAL at 21:31

## 2023-05-07 NOTE — PROGRESS NOTE ADULT - SUBJECTIVE AND OBJECTIVE BOX
HPI  Patient is a 52y old Female who presents with a chief complaint of hematemesis and elevated LFT (07 May 2023 13:02)    Currently admitted to medicine with the primary diagnosis of Choledocholithiasis with acute cholecystitis       Today is hospital day 1d.     INTERVAL HPI / OVERNIGHT EVENTS:  Patient was seen and examined at bedside  Patient Feels better  Denies any complains of chest pain or shortness of breath  mild abdominal pain  - no nausea/vomiting        PAST MEDICAL & SURGICAL HISTORY  Diabetes    Hypertension    GERD (gastroesophageal reflux disease)    No significant past surgical history      ALLERGIES  No Known Allergies    MEDICATIONS  STANDING MEDICATIONS  amLODIPine   Tablet 10 milliGRAM(s) Oral at bedtime  atorvastatin 40 milliGRAM(s) Oral at bedtime  cefepime   IVPB 2000 milliGRAM(s) IV Intermittent every 12 hours  chlorhexidine 2% Cloths 1 Application(s) Topical <User Schedule>  dextrose 5%. 1000 milliLiter(s) IV Continuous <Continuous>  dextrose 50% Injectable 25 Gram(s) IV Push once  glucagon  Injectable 1 milliGRAM(s) IntraMuscular once  hydrochlorothiazide 25 milliGRAM(s) Oral once  insulin lispro (ADMELOG) corrective regimen sliding scale   SubCutaneous three times a day before meals  lactated ringers. 1000 milliLiter(s) IV Continuous <Continuous>  losartan 100 milliGRAM(s) Oral daily  metroNIDAZOLE  IVPB 500 milliGRAM(s) IV Intermittent every 8 hours  pantoprazole  Injectable 40 milliGRAM(s) IV Push daily  ranolazine 500 milliGRAM(s) Oral two times a day    PRN MEDICATIONS  acetaminophen     Tablet .. 650 milliGRAM(s) Oral every 6 hours PRN  aluminum hydroxide/magnesium hydroxide/simethicone Suspension 30 milliLiter(s) Oral every 4 hours PRN  dextrose Oral Gel 15 Gram(s) Oral once PRN  melatonin 3 milliGRAM(s) Oral at bedtime PRN  ondansetron Injectable 4 milliGRAM(s) IV Push every 8 hours PRN    VITALS:  T(F): 97.5  HR: 57  BP: 93/55  RR: 18  SpO2: 97%    PHYSICAL EXAM  GEN: no distress, comfortable  PULM: BS heard b/l equal, No wheezing  CVS: S1S2 present, no rubs or gallops  ABD: Soft, non-distended, no guarding; non-tender  NEURO: A&Ox3    LABS                        13.0   5.66  )-----------( 270      ( 07 May 2023 09:26 )             40.0     05-07    139  |  105  |  14  ----------------------------<  89  3.4<L>   |  23  |  0.7    Ca    9.0      07 May 2023 09:26  Phos  3.7     05-07  Mg     1.9     05-07    TPro  7.0  /  Alb  3.4<L>  /  TBili  1.2  /  DBili  x   /  AST  400<H>  /  ALT  517<H>  /  AlkPhos  168<H>  05-07    PT/INR - ( 06 May 2023 13:04 )   PT: 12.70 sec;   INR: 1.11 ratio         PTT - ( 06 May 2023 13:04 )  PTT:36.0 sec              RADIOLOGY

## 2023-05-07 NOTE — PROGRESS NOTE ADULT - SUBJECTIVE AND OBJECTIVE BOX
GENERAL SURGERY PROGRESS NOTE    Patient: SACKS, TERESA , 52y ([birth year])Female   MRN: [id number]  Location: [place] [address] A  Visit: 05-06-23 Inpatient  Date: 05-07-23 @ 03:34    Events of past 24 hours:   NAEON  Patient transfer from [facility] with abdominal pain and intermittent episodes of vomiting for one day. The patient reports that they were blood tinged.  The patient denies that every happening before. Patient denies history of abdominal surgery, colonoscopy or EGD in past.   Patient seen and examined at bedside, resting comfortably  Patient passing BM and flatus  Patient denies any nausea vomiting    PAST MEDICAL & SURGICAL HISTORY:  Diabetes      Hypertension      GERD (gastroesophageal reflux disease)      No significant past surgical history          Vitals:   T(F): 97.9 (05-06-23 @ 22:27), Max: 98.4 (05-06-23 @ 12:34)  HR: 59 (05-06-23 @ 22:27)  BP: 111/60 (05-06-23 @ 22:27)  RR: 18 (05-06-23 @ 22:27)  SpO2: 97% (05-06-23 @ 19:40)      Diet, Clear Liquid:   Consistent Carbohydrate No Snacks  No Red Dye      Fluids:     I & O's:      PHYSICAL EXAM:  GENERAL: laying in bed, appearing comfortable and in NAD; non-toxic appearing  HEENT: Moist mucous membranes  NECK: Supple  CHEST/LUNG: even respirations b/l; no accessory muscle use  ABDOMEN: Soft, Nontender, Nondistended no g/r/r.   EXTREMITIES:   No calf TTP b/l  SKIN: warm  Neuro: A&Ox4    MEDICATIONS  (STANDING):  amLODIPine   Tablet 10 milliGRAM(s) Oral at bedtime  atorvastatin 40 milliGRAM(s) Oral at bedtime  cefepime   IVPB 2000 milliGRAM(s) IV Intermittent every 12 hours  chlorhexidine 2% Cloths 1 Application(s) Topical <User Schedule>  dextrose 5%. 1000 milliLiter(s) (50 mL/Hr) IV Continuous <Continuous>  dextrose 50% Injectable 25 Gram(s) IV Push once  glucagon  Injectable 1 milliGRAM(s) IntraMuscular once  hydrochlorothiazide 25 milliGRAM(s) Oral once  insulin lispro (ADMELOG) corrective regimen sliding scale   SubCutaneous three times a day before meals  losartan 100 milliGRAM(s) Oral daily  metroNIDAZOLE  IVPB 500 milliGRAM(s) IV Intermittent every 8 hours  pantoprazole  Injectable 40 milliGRAM(s) IV Push daily  ranolazine 500 milliGRAM(s) Oral two times a day    MEDICATIONS  (PRN):  acetaminophen     Tablet .. 650 milliGRAM(s) Oral every 6 hours PRN Temp greater or equal to 38C (100.4F), Mild Pain (1 - 3)  aluminum hydroxide/magnesium hydroxide/simethicone Suspension 30 milliLiter(s) Oral every 4 hours PRN Dyspepsia  dextrose Oral Gel 15 Gram(s) Oral once PRN Blood Glucose LESS THAN 70 milliGRAM(s)/deciliter  melatonin 3 milliGRAM(s) Oral at bedtime PRN Insomnia  ondansetron Injectable 4 milliGRAM(s) IV Push every 8 hours PRN Nausea and/or Vomiting      DVT PROPHYLAXIS:   GI PROPHYLAXIS: pantoprazole  Injectable 40 milliGRAM(s) IV Push daily    ANTICOAGULATION:   ANTIBIOTICS:  cefepime   IVPB 2000 milliGRAM(s)  metroNIDAZOLE  IVPB 500 milliGRAM(s)            LAB/STUDIES:  Labs:  CAPILLARY BLOOD GLUCOSE      POCT Blood Glucose.: 96 mg/dL (06 May 2023 22:15)                          12.3   6.29  )-----------( 278      ( 07 May 2023 01:36 )             37.9       Auto Neutrophil %: 68.1 % (05-06-23 @ 13:04)  Auto Immature Granulocyte %: 0.4 % (05-06-23 @ 13:04)    05-07    145  |  109  |  13  ----------------------------<  88  3.7   |  25  |  0.7      Calcium, Total Serum: 9.0 mg/dL (05-07-23 @ 01:36)      LFTs:             6.8  | 1.2  | 368      ------------------[168     ( 07 May 2023 01:36 )  3.3  | 0.5  | 491         Lipase:x      Amylase:x             Coags:     12.70  ----< 1.11    ( 06 May 2023 13:04 )     36.0

## 2023-05-07 NOTE — CONSULT NOTE ADULT - ATTENDING COMMENTS
above noted discussed case with surgical resident and PA abdomen soft no distension tender RUQ labs noted needs MRCP/ERCP
I edited the note

## 2023-05-07 NOTE — PROGRESS NOTE ADULT - ASSESSMENT
51 y/o F PMHx DM, HTN, GERD c/o 1 week h/o intermittent upper mid-abdominal pain and intermittent episodes of vomiting. First episode of vomiting was a week ago with small amount observed per pt; last episode of vomiting was this morning on an empty stomach. Pt reports abd pain is 1-2/10 when sxs occur (no known triggers). Pt denies chest pain, fever, SOB, recent foreign travels, lightheadedness, dysuria, hematuria, dark or bloody stools. In ED, labs revealed elevated LFTS, including elevated lipase and d. bili. Imaging demonstrated findings consistent w/ cholecystitis     Plan   # Biliary Colic   # Gallstones and wall thickening with trace pericholecystic fluid seen on US, CBD dilation 1 cm on CT ab  # elevated LFTs  - IV  cefepime and flagyl  - CLD for now ,  - GI consulted, likely ERCP this Monday  - trend LFT  - f/u blood Cx  - cards consult - pt on ranexa

## 2023-05-07 NOTE — CONSULT NOTE ADULT - SUBJECTIVE AND OBJECTIVE BOX
Gastroenterology Consultation:    Patient is a 52y old  Female who presents with a chief complaint of     Admitted on: 05-06-23  HPI:  53 y/o F PMHx DM, HTN, GERD on PPI once a day here for hematemesis.   As per the patient she has one episode of vomiting with bile and one tea spoon of blood on 4/27 followed by           c/o 1 week h/o intermittent upper mid-abdominal pain and intermittent episodes of vomiting. First episode of vomiting was a week ago with small amount observed per pt; last episode of vomiting was this morning on an empty stomach.  Pt denies chest pain, fever, SOB, recent foreign travels, lightheadedness, dysuria, hematuria, bloody stools   (06 May 2023 17:15)      Prior EGD: never had   Prior Colonoscopy: never had       PAST MEDICAL & SURGICAL HISTORY:  Diabetes      Hypertension      GERD (gastroesophageal reflux disease)      No significant past surgical history          FAMILY HISTORY:  FH: HTN (hypertension) (Father)        Social History:  Tobacco: N  Alcohol: n  Drugs: n    Home Medications:  AMLOD/ATORVA 10-40MG TAB:  (06 May 2023 17:30)  HYDROCHLOROT 25MG TAB:  (06 May 2023 17:30)  JANUMET 50-1,000 MG TABLET: TAKE 1 TABLET BY MOUTH EVERY DAY (06 May 2023 17:30)  JARDIANCE 25 MG TABLET: TAKE 1 TABLET BY MOUTH EVERY DAY (06 May 2023 17:30)  LOSARTAN 100MG TAB:  (06 May 2023 17:30)  PANTOPRAZOLE 40MG DR TAB:  (06 May 2023 17:30)  RANOLAZINE 500MG ER TAB:  (06 May 2023 17:30)  TRESIBA FLEX 100U/ML PEN:  (06 May 2023 18:02)  TRULICITY 1.5 MG/0.5 ML PEN: INJECT 0.5 ML SUBCUTANEOUSLY ONE TIME PER WEEK (06 May 2023 17:30)    MEDICATIONS  (STANDING):  amLODIPine   Tablet 10 milliGRAM(s) Oral at bedtime  atorvastatin 40 milliGRAM(s) Oral at bedtime  cefepime   IVPB 2000 milliGRAM(s) IV Intermittent every 12 hours  chlorhexidine 2% Cloths 1 Application(s) Topical <User Schedule>  dextrose 5%. 1000 milliLiter(s) (50 mL/Hr) IV Continuous <Continuous>  dextrose 50% Injectable 25 Gram(s) IV Push once  glucagon  Injectable 1 milliGRAM(s) IntraMuscular once  hydrochlorothiazide 25 milliGRAM(s) Oral once  insulin lispro (ADMELOG) corrective regimen sliding scale   SubCutaneous three times a day before meals  lactated ringers. 1000 milliLiter(s) (115 mL/Hr) IV Continuous <Continuous>  losartan 100 milliGRAM(s) Oral daily  metroNIDAZOLE  IVPB 500 milliGRAM(s) IV Intermittent every 8 hours  pantoprazole  Injectable 40 milliGRAM(s) IV Push daily  ranolazine 500 milliGRAM(s) Oral two times a day    MEDICATIONS  (PRN):  acetaminophen     Tablet .. 650 milliGRAM(s) Oral every 6 hours PRN Temp greater or equal to 38C (100.4F), Mild Pain (1 - 3)  aluminum hydroxide/magnesium hydroxide/simethicone Suspension 30 milliLiter(s) Oral every 4 hours PRN Dyspepsia  dextrose Oral Gel 15 Gram(s) Oral once PRN Blood Glucose LESS THAN 70 milliGRAM(s)/deciliter  melatonin 3 milliGRAM(s) Oral at bedtime PRN Insomnia  ondansetron Injectable 4 milliGRAM(s) IV Push every 8 hours PRN Nausea and/or Vomiting      Allergies  No Known Allergies      Review of Systems:   Constitutional:  No Fever, No Chills  ENT/Mouth:  No Hearing Changes,  No Difficulty Swallowing  Eyes:  No Eye Pain, No Vision Changes  Cardiovascular:  No Chest Pain, No Palpitations  Respiratory:  No Cough, No Dyspnea  Gastrointestinal:  As described in HPI  Musculoskeletal:  No Joint Swelling, No Back Pain  Skin:  No Skin Lesions, No Jaundice  Neuro:  No Syncope, No Dizziness  Heme/Lymph:  No Bruising, No Bleeding.          Physical Examination:  T(C): 36.4 (05-07-23 @ 04:50), Max: 36.9 (05-06-23 @ 19:40)  HR: 57 (05-07-23 @ 04:50) (57 - 67)  BP: 93/55 (05-07-23 @ 04:50) (93/55 - 122/74)  RR: 18 (05-07-23 @ 04:50) (18 - 18)  SpO2: 97% (05-06-23 @ 19:40) (97% - 99%)        Constitutional: No acute distress.  Eyes:. Conjunctivae are clear, Sclera is non-icteric.  Ears Nose and Throat: The external ears are normal appearing,  Oral mucosa is pink and moist.  Respiratory:  No signs of respiratory distress. Lung sounds are clear bilaterally.  Cardiovascular:  S1 S2, Regular rate and rhythm.  GI: Abdomen is soft, symmetric, and non-tender without distention. There are no visible lesions or scars. Bowel sounds are present and normoactive in all four quadrants. No masses, hepatomegaly, or splenomegaly are noted.   Neuro: No Tremor, No involuntary movements  Skin: No rashes, No Jaundice.          Data:                        13.0   5.66  )-----------( 270      ( 07 May 2023 09:26 )             40.0     Hgb Trend:  13.0  05-07-23 @ 09:26  12.3  05-07-23 @ 01:36  14.0  05-06-23 @ 13:04      05-07    139  |  105  |  14  ----------------------------<  89  3.4<L>   |  23  |  0.7    Ca    9.0      07 May 2023 09:26  Phos  3.7     05-07  Mg     1.9     05-07    TPro  7.0  /  Alb  3.4<L>  /  TBili  1.2  /  DBili  x   /  AST  400<H>  /  ALT  517<H>  /  AlkPhos  168<H>  05-07    Liver panel trend:  TBili 1.2   /      /      /   AlkP 168   /   Tptn 7.0   /   Alb 3.4    /   DBili --      05-07  TBili 1.2   /      /      /   AlkP 168   /   Tptn 6.8   /   Alb 3.3    /   DBili 0.5      05-07  TBili 1.0   /      /      /   AlkP 247   /   Tptn 7.6   /   Alb 3.8    /   DBili 0.5      05-06      PT/INR - ( 06 May 2023 13:04 )   PT: 12.70 sec;   INR: 1.11 ratio         PTT - ( 06 May 2023 13:04 )  PTT:36.0 sec        Radiology:  CT Abdomen and Pelvis w/ IV Cont:   ACC: [id number] EXAM:  CT ABDOMEN AND PELVIS IC   ORDERED BY: ROWENA ROQUE     PROCEDURE DATE:  05/06/2023          INTERPRETATION:  CLINICAL HISTORY: Transaminitis and vomiting. Abdominal   pain.    TECHNIQUE: Contiguous axial CT images were obtained from the lower chest   to the pubic symphysis following administration of intravenous contrast.   Oral contrast was not administered. Reformatted images in the coronal and   sagittal planes were acquired.    COMPARISON: None.    FINDINGS:    LOWER CHEST: Unremarkable.    HEPATOBILIARY: Mild intra and extrahepatic biliary ductal dilatation,   with CBD measuring up to 1 cm and demonstrates an abrupt change of   caliber at its distal end. No definite choledocholithiasis identified.   Nonspecific gallbladder wall edema. Cholelithiasis. Right hepatic   hypodensity too small to characterize..    SPLEEN: Unremarkable.    PANCREAS: Unremarkable.    ADRENAL GLANDS: Unremarkable.    KIDNEYS: Symmetric enhancement. No hydronephrosis. Bilateral parapelvic   cysts. Nonobstructing 9 mm right lower pole calculus.    ABDOMINOPELVIC NODES: No lymphadenopathy.    PELVIC ORGANS: Post bilateral tubal ligation.    PERITONEUM/MESENTERY/BOWEL: No bowel obstruction, ascites or   intraperitoneal free air. Unremarkable appendix.    BONES/SOFT TISSUES: Degenerative changes of the spine. Small   fat-containing umbilical hernia.    OTHER: Aorta is normal in caliber.    IMPRESSION:    Mild intra and extrahepatic biliary ductal dilatation, with CBD measuring   up to 1 cm and demonstrates an abrupt change of caliber at its distal   end. No definite choledocholithiasis identified. Recommend MRCP for   better evaluation of the biliary tree.    Gallbladder wall edema, gallstone and inflammatory changes in the   gallbladder fossa suggesting acute cholecystitis. Confirmation with   abdominal ultrasound suggested.    --- End of Report ---          ONEIL CARDENAS MD; Resident Radiologist  This document has been electronically signed.  DORCAS KENNEY MD; Attending Radiologist  This document has been electronically signed. May  6 2023  3:57PM (05-06-23 @ 14:24)    US Abdomen Upper Quadrant Right:   ACC: [id number] EXAM:  US ABDOMEN RT UPR QUADRANT   ORDERED BY: ROWENA ROQUE     PROCEDURE DATE:  05/06/2023          INTERPRETATION:  CLINICAL HISTORY / REASON FOR EXAM: Right upper quadrant   pain.    CORRELATION: CT abdomen and pelvis from May 6, 2023    PROCEDURE: Ultrasound of the right upper quadrant was performed.    FINDINGS:    LIVER: Within normal limits.  BILE DUCTS: Mild intrahepatic biliary ductal dilatation. Common bile duct   measures 6 mm.  GALLBLADDER: Multiple gallstones, the largest measuring up to 0.7 x 0.7   cm. Gallbladder wall thickening up to 6 mm. Trace pericholecystic fluid.   Negative sonographic Coronel's sign.  PANCREAS: Visualized portions are within normal limits.  RIGHT KIDNEY: 12 cm. No hydronephrosis.  ASCITES: None.  IVC: Visualized portions are within normal limits.      IMPRESSION:    Gallstones and wall thickening with trace pericholecystic fluid. Reported   negative sonographic Coronel's sign. Findings can represent cholecystitis   in appropriate clinical setting.    --- End of Report ---            DINESH NEWBY MD; Attending Radiologist  This document has been electronically signed. May  6 2023  4:36PM (05-06-23 @ 15:34)     Gastroenterology Consultation:    Patient is a 52y old  Female who presents with a chief complaint of hematemesis     Admitted on: 05-06-23  HPI:  53 y/o F PMHx DM, HTN, GERD on PPI once a day here for hematemesis.   As per the patient she has one episode of vomiting with bile and one tea spoon of blood on 4/27 followed by one episode of vomiting without blood on 5/2. Again on 5/6 she had one episode of vomiting half a cup. Denies any abdominal pain, nausea, vomiting, melena, hematochezia, weight loss, h/o NSAID intake, h/o liver disease, liver infections.     Prior EGD: never had   Prior Colonoscopy: never had       PAST MEDICAL & SURGICAL HISTORY:  Diabetes      Hypertension      GERD (gastroesophageal reflux disease)      No significant past surgical history          FAMILY HISTORY:  FH: HTN (hypertension) (Father)        Social History:  Tobacco: N  Alcohol: n  Drugs: n    Home Medications:  AMLOD/ATORVA 10-40MG TAB:  (06 May 2023 17:30)  HYDROCHLOROT 25MG TAB:  (06 May 2023 17:30)  JANUMET 50-1,000 MG TABLET: TAKE 1 TABLET BY MOUTH EVERY DAY (06 May 2023 17:30)  JARDIANCE 25 MG TABLET: TAKE 1 TABLET BY MOUTH EVERY DAY (06 May 2023 17:30)  LOSARTAN 100MG TAB:  (06 May 2023 17:30)  PANTOPRAZOLE 40MG DR TAB:  (06 May 2023 17:30)  RANOLAZINE 500MG ER TAB:  (06 May 2023 17:30)  TRESIBA FLEX 100U/ML PEN:  (06 May 2023 18:02)  TRULICITY 1.5 MG/0.5 ML PEN: INJECT 0.5 ML SUBCUTANEOUSLY ONE TIME PER WEEK (06 May 2023 17:30)    MEDICATIONS  (STANDING):  amLODIPine   Tablet 10 milliGRAM(s) Oral at bedtime  atorvastatin 40 milliGRAM(s) Oral at bedtime  cefepime   IVPB 2000 milliGRAM(s) IV Intermittent every 12 hours  chlorhexidine 2% Cloths 1 Application(s) Topical <User Schedule>  dextrose 5%. 1000 milliLiter(s) (50 mL/Hr) IV Continuous <Continuous>  dextrose 50% Injectable 25 Gram(s) IV Push once  glucagon  Injectable 1 milliGRAM(s) IntraMuscular once  hydrochlorothiazide 25 milliGRAM(s) Oral once  insulin lispro (ADMELOG) corrective regimen sliding scale   SubCutaneous three times a day before meals  lactated ringers. 1000 milliLiter(s) (115 mL/Hr) IV Continuous <Continuous>  losartan 100 milliGRAM(s) Oral daily  metroNIDAZOLE  IVPB 500 milliGRAM(s) IV Intermittent every 8 hours  pantoprazole  Injectable 40 milliGRAM(s) IV Push daily  ranolazine 500 milliGRAM(s) Oral two times a day    MEDICATIONS  (PRN):  acetaminophen     Tablet .. 650 milliGRAM(s) Oral every 6 hours PRN Temp greater or equal to 38C (100.4F), Mild Pain (1 - 3)  aluminum hydroxide/magnesium hydroxide/simethicone Suspension 30 milliLiter(s) Oral every 4 hours PRN Dyspepsia  dextrose Oral Gel 15 Gram(s) Oral once PRN Blood Glucose LESS THAN 70 milliGRAM(s)/deciliter  melatonin 3 milliGRAM(s) Oral at bedtime PRN Insomnia  ondansetron Injectable 4 milliGRAM(s) IV Push every 8 hours PRN Nausea and/or Vomiting      Allergies  No Known Allergies      Review of Systems:   Constitutional:  No Fever, No Chills  ENT/Mouth:  No Hearing Changes,  No Difficulty Swallowing  Eyes:  No Eye Pain, No Vision Changes  Cardiovascular:  No Chest Pain, No Palpitations  Respiratory:  No Cough, No Dyspnea  Gastrointestinal:  As described in HPI  Musculoskeletal:  No Joint Swelling, No Back Pain  Skin:  No Skin Lesions, No Jaundice  Neuro:  No Syncope, No Dizziness  Heme/Lymph:  No Bruising, No Bleeding.          Physical Examination:  T(C): 36.4 (05-07-23 @ 04:50), Max: 36.9 (05-06-23 @ 19:40)  HR: 57 (05-07-23 @ 04:50) (57 - 67)  BP: 93/55 (05-07-23 @ 04:50) (93/55 - 122/74)  RR: 18 (05-07-23 @ 04:50) (18 - 18)  SpO2: 97% (05-06-23 @ 19:40) (97% - 99%)        Constitutional: No acute distress.  Eyes:. Conjunctivae are clear, Sclera is non-icteric.  Ears Nose and Throat: The external ears are normal appearing,  Oral mucosa is pink and moist.  Respiratory:  No signs of respiratory distress. Lung sounds are clear bilaterally.  Cardiovascular:  S1 S2, Regular rate and rhythm.  GI: Abdomen is soft, symmetric, and non-tender without distention. There are no visible lesions or scars. Bowel sounds are present and normoactive in all four quadrants. No masses, hepatomegaly, or splenomegaly are noted.   Neuro: No Tremor, No involuntary movements  Skin: No rashes, No Jaundice.          Data:                        13.0   5.66  )-----------( 270      ( 07 May 2023 09:26 )             40.0     Hgb Trend:  13.0  05-07-23 @ 09:26  12.3  05-07-23 @ 01:36  14.0  05-06-23 @ 13:04      05-07    139  |  105  |  14  ----------------------------<  89  3.4<L>   |  23  |  0.7    Ca    9.0      07 May 2023 09:26  Phos  3.7     05-07  Mg     1.9     05-07    TPro  7.0  /  Alb  3.4<L>  /  TBili  1.2  /  DBili  x   /  AST  400<H>  /  ALT  517<H>  /  AlkPhos  168<H>  05-07    Liver panel trend:  TBili 1.2   /      /      /   AlkP 168   /   Tptn 7.0   /   Alb 3.4    /   DBili --      05-07  TBili 1.2   /      /      /   AlkP 168   /   Tptn 6.8   /   Alb 3.3    /   DBili 0.5      05-07  TBili 1.0   /      /      /   AlkP 247   /   Tptn 7.6   /   Alb 3.8    /   DBili 0.5      05-06      PT/INR - ( 06 May 2023 13:04 )   PT: 12.70 sec;   INR: 1.11 ratio         PTT - ( 06 May 2023 13:04 )  PTT:36.0 sec        Radiology:  CT Abdomen and Pelvis w/ IV Cont:   ACC: [id number] EXAM:  CT ABDOMEN AND PELVIS IC   ORDERED BY: ROWENA ROQUE     PROCEDURE DATE:  05/06/2023          INTERPRETATION:  CLINICAL HISTORY: Transaminitis and vomiting. Abdominal   pain.    TECHNIQUE: Contiguous axial CT images were obtained from the lower chest   to the pubic symphysis following administration of intravenous contrast.   Oral contrast was not administered. Reformatted images in the coronal and   sagittal planes were acquired.    COMPARISON: None.    FINDINGS:    LOWER CHEST: Unremarkable.    HEPATOBILIARY: Mild intra and extrahepatic biliary ductal dilatation,   with CBD measuring up to 1 cm and demonstrates an abrupt change of   caliber at its distal end. No definite choledocholithiasis identified.   Nonspecific gallbladder wall edema. Cholelithiasis. Right hepatic   hypodensity too small to characterize..    SPLEEN: Unremarkable.    PANCREAS: Unremarkable.    ADRENAL GLANDS: Unremarkable.    KIDNEYS: Symmetric enhancement. No hydronephrosis. Bilateral parapelvic   cysts. Nonobstructing 9 mm right lower pole calculus.    ABDOMINOPELVIC NODES: No lymphadenopathy.    PELVIC ORGANS: Post bilateral tubal ligation.    PERITONEUM/MESENTERY/BOWEL: No bowel obstruction, ascites or   intraperitoneal free air. Unremarkable appendix.    BONES/SOFT TISSUES: Degenerative changes of the spine. Small   fat-containing umbilical hernia.    OTHER: Aorta is normal in caliber.    IMPRESSION:    Mild intra and extrahepatic biliary ductal dilatation, with CBD measuring   up to 1 cm and demonstrates an abrupt change of caliber at its distal   end. No definite choledocholithiasis identified. Recommend MRCP for   better evaluation of the biliary tree.    Gallbladder wall edema, gallstone and inflammatory changes in the   gallbladder fossa suggesting acute cholecystitis. Confirmation with   abdominal ultrasound suggested.    --- End of Report ---          ONEIL CARDENAS MD; Resident Radiologist  This document has been electronically signed.  DORCAS KENNEY MD; Attending Radiologist  This document has been electronically signed. May  6 2023  3:57PM (05-06-23 @ 14:24)    US Abdomen Upper Quadrant Right:   ACC: [id number] EXAM:  US ABDOMEN RT UPR QUADRANT   ORDERED BY: ROWENA ROQUE     PROCEDURE DATE:  05/06/2023          INTERPRETATION:  CLINICAL HISTORY / REASON FOR EXAM: Right upper quadrant   pain.    CORRELATION: CT abdomen and pelvis from May 6, 2023    PROCEDURE: Ultrasound of the right upper quadrant was performed.    FINDINGS:    LIVER: Within normal limits.  BILE DUCTS: Mild intrahepatic biliary ductal dilatation. Common bile duct   measures 6 mm.  GALLBLADDER: Multiple gallstones, the largest measuring up to 0.7 x 0.7   cm. Gallbladder wall thickening up to 6 mm. Trace pericholecystic fluid.   Negative sonographic Coronel's sign.  PANCREAS: Visualized portions are within normal limits.  RIGHT KIDNEY: 12 cm. No hydronephrosis.  ASCITES: None.  IVC: Visualized portions are within normal limits.      IMPRESSION:    Gallstones and wall thickening with trace pericholecystic fluid. Reported   negative sonographic Coronel's sign. Findings can represent cholecystitis   in appropriate clinical setting.    --- End of Report ---            DINESH NEWBY MD; Attending Radiologist  This document has been electronically signed. May  6 2023  4:36PM (05-06-23 @ 15:34)     Gastroenterology Consultation:    Patient is a 52y old  Female who presents with a chief complaint of hematemesis     Admitted on: 05-06-23  HPI:  53 y/o F PMHx DM, HTN, GERD on PPI once a day here for hematemesis.   As per the patient she has one episode of vomiting with bile and one tea spoon of blood on 4/27 followed by one episode of vomiting without blood on 5/2. Again on 5/6 she had one episode of vomiting half a cup. Denies any abdominal pain, nausea, vomiting, melena, hematochezia, weight loss, h/o NSAID intake, h/o liver disease, liver infections. No fever or chills. GI consulted for hematemesis, elevated LFTs and dilated CBD.     Prior EGD: never had   Prior Colonoscopy: never had       PAST MEDICAL & SURGICAL HISTORY:  Diabetes      Hypertension      GERD (gastroesophageal reflux disease)      No significant past surgical history          FAMILY HISTORY:  FH: HTN (hypertension) (Father)        Social History:  Tobacco: N  Alcohol: n  Drugs: n    Home Medications:  AMLOD/ATORVA 10-40MG TAB:  (06 May 2023 17:30)  HYDROCHLOROT 25MG TAB:  (06 May 2023 17:30)  JANUMET 50-1,000 MG TABLET: TAKE 1 TABLET BY MOUTH EVERY DAY (06 May 2023 17:30)  JARDIANCE 25 MG TABLET: TAKE 1 TABLET BY MOUTH EVERY DAY (06 May 2023 17:30)  LOSARTAN 100MG TAB:  (06 May 2023 17:30)  PANTOPRAZOLE 40MG DR TAB:  (06 May 2023 17:30)  RANOLAZINE 500MG ER TAB:  (06 May 2023 17:30)  TRESIBA FLEX 100U/ML PEN:  (06 May 2023 18:02)  TRULICITY 1.5 MG/0.5 ML PEN: INJECT 0.5 ML SUBCUTANEOUSLY ONE TIME PER WEEK (06 May 2023 17:30)    MEDICATIONS  (STANDING):  amLODIPine   Tablet 10 milliGRAM(s) Oral at bedtime  atorvastatin 40 milliGRAM(s) Oral at bedtime  cefepime   IVPB 2000 milliGRAM(s) IV Intermittent every 12 hours  chlorhexidine 2% Cloths 1 Application(s) Topical <User Schedule>  dextrose 5%. 1000 milliLiter(s) (50 mL/Hr) IV Continuous <Continuous>  dextrose 50% Injectable 25 Gram(s) IV Push once  glucagon  Injectable 1 milliGRAM(s) IntraMuscular once  hydrochlorothiazide 25 milliGRAM(s) Oral once  insulin lispro (ADMELOG) corrective regimen sliding scale   SubCutaneous three times a day before meals  lactated ringers. 1000 milliLiter(s) (115 mL/Hr) IV Continuous <Continuous>  losartan 100 milliGRAM(s) Oral daily  metroNIDAZOLE  IVPB 500 milliGRAM(s) IV Intermittent every 8 hours  pantoprazole  Injectable 40 milliGRAM(s) IV Push daily  ranolazine 500 milliGRAM(s) Oral two times a day    MEDICATIONS  (PRN):  acetaminophen     Tablet .. 650 milliGRAM(s) Oral every 6 hours PRN Temp greater or equal to 38C (100.4F), Mild Pain (1 - 3)  aluminum hydroxide/magnesium hydroxide/simethicone Suspension 30 milliLiter(s) Oral every 4 hours PRN Dyspepsia  dextrose Oral Gel 15 Gram(s) Oral once PRN Blood Glucose LESS THAN 70 milliGRAM(s)/deciliter  melatonin 3 milliGRAM(s) Oral at bedtime PRN Insomnia  ondansetron Injectable 4 milliGRAM(s) IV Push every 8 hours PRN Nausea and/or Vomiting      Allergies  No Known Allergies      Review of Systems:   Constitutional:  No Fever, No Chills  ENT/Mouth:  No Hearing Changes,  No Difficulty Swallowing  Eyes:  No Eye Pain, No Vision Changes  Cardiovascular:  No Chest Pain, No Palpitations  Respiratory:  No Cough, No Dyspnea  Gastrointestinal:  As described in HPI  Musculoskeletal:  No Joint Swelling, No Back Pain  Skin:  No Skin Lesions, No Jaundice  Neuro:  No Syncope, No Dizziness  Heme/Lymph:  No Bruising, No Bleeding.          Physical Examination:  T(C): 36.4 (05-07-23 @ 04:50), Max: 36.9 (05-06-23 @ 19:40)  HR: 57 (05-07-23 @ 04:50) (57 - 67)  BP: 93/55 (05-07-23 @ 04:50) (93/55 - 122/74)  RR: 18 (05-07-23 @ 04:50) (18 - 18)  SpO2: 97% (05-06-23 @ 19:40) (97% - 99%)        Constitutional: No acute distress.  Eyes:. Conjunctivae are clear, Sclera is non-icteric.  Ears Nose and Throat: The external ears are normal appearing,  Oral mucosa is pink and moist.  Respiratory:  No signs of respiratory distress. Lung sounds are clear bilaterally.  Cardiovascular:  S1 S2, Regular rate and rhythm.  GI: Abdomen is soft, symmetric, and non-tender without distention.  Bowel sounds are present and normoactive in all four quadrants.   Neuro: No Tremor, No involuntary movements  Skin: No rashes, No Jaundice.          Data:                        13.0   5.66  )-----------( 270      ( 07 May 2023 09:26 )             40.0     Hgb Trend:  13.0  05-07-23 @ 09:26  12.3  05-07-23 @ 01:36  14.0  05-06-23 @ 13:04      05-07    139  |  105  |  14  ----------------------------<  89  3.4<L>   |  23  |  0.7    Ca    9.0      07 May 2023 09:26  Phos  3.7     05-07  Mg     1.9     05-07    TPro  7.0  /  Alb  3.4<L>  /  TBili  1.2  /  DBili  x   /  AST  400<H>  /  ALT  517<H>  /  AlkPhos  168<H>  05-07    Liver panel trend:  TBili 1.2   /      /      /   AlkP 168   /   Tptn 7.0   /   Alb 3.4    /   DBili --      05-07  TBili 1.2   /      /      /   AlkP 168   /   Tptn 6.8   /   Alb 3.3    /   DBili 0.5      05-07  TBili 1.0   /      /      /   AlkP 247   /   Tptn 7.6   /   Alb 3.8    /   DBili 0.5      05-06      PT/INR - ( 06 May 2023 13:04 )   PT: 12.70 sec;   INR: 1.11 ratio         PTT - ( 06 May 2023 13:04 )  PTT:36.0 sec        Radiology:  CT Abdomen and Pelvis w/ IV Cont:   ACC: [id number] EXAM:  CT ABDOMEN AND PELVIS IC   ORDERED BY: ROWENA ROQUE     PROCEDURE DATE:  05/06/2023          INTERPRETATION:  CLINICAL HISTORY: Transaminitis and vomiting. Abdominal   pain.    TECHNIQUE: Contiguous axial CT images were obtained from the lower chest   to the pubic symphysis following administration of intravenous contrast.   Oral contrast was not administered. Reformatted images in the coronal and   sagittal planes were acquired.    COMPARISON: None.    FINDINGS:    LOWER CHEST: Unremarkable.    HEPATOBILIARY: Mild intra and extrahepatic biliary ductal dilatation,   with CBD measuring up to 1 cm and demonstrates an abrupt change of   caliber at its distal end. No definite choledocholithiasis identified.   Nonspecific gallbladder wall edema. Cholelithiasis. Right hepatic   hypodensity too small to characterize..    SPLEEN: Unremarkable.    PANCREAS: Unremarkable.    ADRENAL GLANDS: Unremarkable.    KIDNEYS: Symmetric enhancement. No hydronephrosis. Bilateral parapelvic   cysts. Nonobstructing 9 mm right lower pole calculus.    ABDOMINOPELVIC NODES: No lymphadenopathy.    PELVIC ORGANS: Post bilateral tubal ligation.    PERITONEUM/MESENTERY/BOWEL: No bowel obstruction, ascites or   intraperitoneal free air. Unremarkable appendix.    BONES/SOFT TISSUES: Degenerative changes of the spine. Small   fat-containing umbilical hernia.    OTHER: Aorta is normal in caliber.    IMPRESSION:    Mild intra and extrahepatic biliary ductal dilatation, with CBD measuring   up to 1 cm and demonstrates an abrupt change of caliber at its distal   end. No definite choledocholithiasis identified. Recommend MRCP for   better evaluation of the biliary tree.    Gallbladder wall edema, gallstone and inflammatory changes in the   gallbladder fossa suggesting acute cholecystitis. Confirmation with   abdominal ultrasound suggested.    --- End of Report ---          ONEIL CARDENAS MD; Resident Radiologist  This document has been electronically signed.  DORCAS KENNEY MD; Attending Radiologist  This document has been electronically signed. May  6 2023  3:57PM (05-06-23 @ 14:24)    US Abdomen Upper Quadrant Right:   ACC: [id number] EXAM:  US ABDOMEN RT UPR QUADRANT   ORDERED BY: ROWENA ROQUE     PROCEDURE DATE:  05/06/2023          INTERPRETATION:  CLINICAL HISTORY / REASON FOR EXAM: Right upper quadrant   pain.    CORRELATION: CT abdomen and pelvis from May 6, 2023    PROCEDURE: Ultrasound of the right upper quadrant was performed.    FINDINGS:    LIVER: Within normal limits.  BILE DUCTS: Mild intrahepatic biliary ductal dilatation. Common bile duct   measures 6 mm.  GALLBLADDER: Multiple gallstones, the largest measuring up to 0.7 x 0.7   cm. Gallbladder wall thickening up to 6 mm. Trace pericholecystic fluid.   Negative sonographic Coronel's sign.  PANCREAS: Visualized portions are within normal limits.  RIGHT KIDNEY: 12 cm. No hydronephrosis.  ASCITES: None.  IVC: Visualized portions are within normal limits.      IMPRESSION:    Gallstones and wall thickening with trace pericholecystic fluid. Reported   negative sonographic Coronel's sign. Findings can represent cholecystitis   in appropriate clinical setting.    --- End of Report ---            DINESH NEWBY MD; Attending Radiologist  This document has been electronically signed. May  6 2023  4:36PM (05-06-23 @ 15:34)

## 2023-05-07 NOTE — PROGRESS NOTE ADULT - ASSESSMENT
53 y/o F PMHx DM, HTN, GERD presenting with intermittent upper mid-abdominal pain and intermittent episodes of vomiting.     # choledocholithiasis and cholelithiasis  -r/o cholecysitis  GI and surgery team following  continue antibiotics  tytlenol PRN for pain control  clear liquid diet today  possible ERCP tomorrow; NPO after midnight  monitor LFT    #HTN  -c/w home meds  hold HCTZ    #DM  - hold PO meds   -ISS - monitor blood sugar  - takes trulicity, jardiance, janumet and tresiba at home    #GERD  - c/w pantoprazole    DVT px- patient ambulatory

## 2023-05-08 ENCOUNTER — RESULT REVIEW (OUTPATIENT)
Age: 53
End: 2023-05-08

## 2023-05-08 ENCOUNTER — TRANSCRIPTION ENCOUNTER (OUTPATIENT)
Age: 53
End: 2023-05-08

## 2023-05-08 LAB
A1C WITH ESTIMATED AVERAGE GLUCOSE RESULT: 8.6 % — HIGH (ref 4–5.6)
ALBUMIN SERPL ELPH-MCNC: 3.2 G/DL — LOW (ref 3.5–5.2)
ALP SERPL-CCNC: 142 U/L — HIGH (ref 30–115)
ALT FLD-CCNC: 455 U/L — HIGH (ref 0–41)
ANION GAP SERPL CALC-SCNC: 10 MMOL/L — SIGNIFICANT CHANGE UP (ref 7–14)
ANION GAP SERPL CALC-SCNC: 11 MMOL/L — SIGNIFICANT CHANGE UP (ref 7–14)
APTT BLD: 34 SEC — SIGNIFICANT CHANGE UP (ref 27–39.2)
AST SERPL-CCNC: 406 U/L — HIGH (ref 0–41)
BASOPHILS # BLD AUTO: 0.03 K/UL — SIGNIFICANT CHANGE UP (ref 0–0.2)
BASOPHILS NFR BLD AUTO: 0.7 % — SIGNIFICANT CHANGE UP (ref 0–1)
BILIRUB SERPL-MCNC: 1.2 MG/DL — SIGNIFICANT CHANGE UP (ref 0.2–1.2)
BLD GP AB SCN SERPL QL: SIGNIFICANT CHANGE UP
BUN SERPL-MCNC: 11 MG/DL — SIGNIFICANT CHANGE UP (ref 10–20)
BUN SERPL-MCNC: 12 MG/DL — SIGNIFICANT CHANGE UP (ref 10–20)
CALCIUM SERPL-MCNC: 8.7 MG/DL — SIGNIFICANT CHANGE UP (ref 8.4–10.5)
CALCIUM SERPL-MCNC: 8.9 MG/DL — SIGNIFICANT CHANGE UP (ref 8.4–10.5)
CHLORIDE SERPL-SCNC: 107 MMOL/L — SIGNIFICANT CHANGE UP (ref 98–110)
CO2 SERPL-SCNC: 22 MMOL/L — SIGNIFICANT CHANGE UP (ref 17–32)
CO2 SERPL-SCNC: 23 MMOL/L — SIGNIFICANT CHANGE UP (ref 17–32)
CREAT SERPL-MCNC: 0.6 MG/DL — LOW (ref 0.7–1.5)
CREAT SERPL-MCNC: 0.6 MG/DL — LOW (ref 0.7–1.5)
EGFR: 108 ML/MIN/1.73M2 — SIGNIFICANT CHANGE UP
EGFR: 108 ML/MIN/1.73M2 — SIGNIFICANT CHANGE UP
EOSINOPHIL # BLD AUTO: 0.01 K/UL — SIGNIFICANT CHANGE UP (ref 0–0.7)
EOSINOPHIL NFR BLD AUTO: 0.2 % — SIGNIFICANT CHANGE UP (ref 0–8)
ESTIMATED AVERAGE GLUCOSE: 200 MG/DL — HIGH (ref 68–114)
GLUCOSE BLDC GLUCOMTR-MCNC: 82 MG/DL — SIGNIFICANT CHANGE UP (ref 70–99)
GLUCOSE BLDC GLUCOMTR-MCNC: 94 MG/DL — SIGNIFICANT CHANGE UP (ref 70–99)
GLUCOSE SERPL-MCNC: 102 MG/DL — HIGH (ref 70–99)
GLUCOSE SERPL-MCNC: 90 MG/DL — SIGNIFICANT CHANGE UP (ref 70–99)
HCT VFR BLD CALC: 39 % — SIGNIFICANT CHANGE UP (ref 37–47)
HGB BLD-MCNC: 13 G/DL — SIGNIFICANT CHANGE UP (ref 12–16)
IMM GRANULOCYTES NFR BLD AUTO: 0.2 % — SIGNIFICANT CHANGE UP (ref 0.1–0.3)
INR BLD: 1.22 RATIO — SIGNIFICANT CHANGE UP (ref 0.65–1.3)
LYMPHOCYTES # BLD AUTO: 1.51 K/UL — SIGNIFICANT CHANGE UP (ref 1.2–3.4)
LYMPHOCYTES # BLD AUTO: 33.1 % — SIGNIFICANT CHANGE UP (ref 20.5–51.1)
MAGNESIUM SERPL-MCNC: 1.8 MG/DL — SIGNIFICANT CHANGE UP (ref 1.8–2.4)
MCHC RBC-ENTMCNC: 31.3 PG — HIGH (ref 27–31)
MCHC RBC-ENTMCNC: 33.3 G/DL — SIGNIFICANT CHANGE UP (ref 32–37)
MCV RBC AUTO: 93.8 FL — SIGNIFICANT CHANGE UP (ref 81–99)
MONOCYTES # BLD AUTO: 0.47 K/UL — SIGNIFICANT CHANGE UP (ref 0.1–0.6)
MONOCYTES NFR BLD AUTO: 10.3 % — HIGH (ref 1.7–9.3)
NEUTROPHILS # BLD AUTO: 2.53 K/UL — SIGNIFICANT CHANGE UP (ref 1.4–6.5)
NEUTROPHILS NFR BLD AUTO: 55.5 % — SIGNIFICANT CHANGE UP (ref 42.2–75.2)
NRBC # BLD: 0 /100 WBCS — SIGNIFICANT CHANGE UP (ref 0–0)
PLATELET # BLD AUTO: 259 K/UL — SIGNIFICANT CHANGE UP (ref 130–400)
PMV BLD: 11 FL — HIGH (ref 7.4–10.4)
POTASSIUM SERPL-MCNC: 3.7 MMOL/L — SIGNIFICANT CHANGE UP (ref 3.5–5)
POTASSIUM SERPL-SCNC: 3.7 MMOL/L — SIGNIFICANT CHANGE UP (ref 3.5–5)
PROT SERPL-MCNC: 6.5 G/DL — SIGNIFICANT CHANGE UP (ref 6–8)
PROTHROM AB SERPL-ACNC: 14 SEC — HIGH (ref 9.95–12.87)
RBC # BLD: 4.16 M/UL — LOW (ref 4.2–5.4)
RBC # FLD: 14.2 % — SIGNIFICANT CHANGE UP (ref 11.5–14.5)
SODIUM SERPL-SCNC: 140 MMOL/L — SIGNIFICANT CHANGE UP (ref 135–146)
WBC # BLD: 4.56 K/UL — LOW (ref 4.8–10.8)
WBC # FLD AUTO: 4.56 K/UL — LOW (ref 4.8–10.8)

## 2023-05-08 PROCEDURE — 43238 EGD US FINE NEEDLE BX/ASPIR: CPT

## 2023-05-08 PROCEDURE — 43239 EGD BIOPSY SINGLE/MULTIPLE: CPT | Mod: XU

## 2023-05-08 PROCEDURE — 88172 CYTP DX EVAL FNA 1ST EA SITE: CPT | Mod: 26

## 2023-05-08 PROCEDURE — 88342 IMHCHEM/IMCYTCHM 1ST ANTB: CPT | Mod: 26,59

## 2023-05-08 PROCEDURE — 88305 TISSUE EXAM BY PATHOLOGIST: CPT | Mod: 26,59

## 2023-05-08 PROCEDURE — 88341 IMHCHEM/IMCYTCHM EA ADD ANTB: CPT | Mod: 26

## 2023-05-08 PROCEDURE — 88305 TISSUE EXAM BY PATHOLOGIST: CPT | Mod: 26

## 2023-05-08 PROCEDURE — 88173 CYTOPATH EVAL FNA REPORT: CPT | Mod: 26

## 2023-05-08 PROCEDURE — 99252 IP/OBS CONSLTJ NEW/EST SF 35: CPT

## 2023-05-08 PROCEDURE — 99231 SBSQ HOSP IP/OBS SF/LOW 25: CPT | Mod: GC

## 2023-05-08 PROCEDURE — 88312 SPECIAL STAINS GROUP 1: CPT | Mod: 26

## 2023-05-08 PROCEDURE — 88189 FLOWCYTOMETRY/READ 16 & >: CPT

## 2023-05-08 RX ORDER — AMLODIPINE BESYLATE 2.5 MG/1
10 TABLET ORAL AT BEDTIME
Refills: 0 | Status: DISCONTINUED | OUTPATIENT
Start: 2023-05-08 | End: 2023-05-09

## 2023-05-08 RX ORDER — PANTOPRAZOLE SODIUM 20 MG/1
40 TABLET, DELAYED RELEASE ORAL
Refills: 0 | Status: DISCONTINUED | OUTPATIENT
Start: 2023-05-08 | End: 2023-05-09

## 2023-05-08 RX ORDER — LOSARTAN POTASSIUM 100 MG/1
100 TABLET, FILM COATED ORAL DAILY
Refills: 0 | Status: DISCONTINUED | OUTPATIENT
Start: 2023-05-08 | End: 2023-05-09

## 2023-05-08 RX ADMIN — RANOLAZINE 500 MILLIGRAM(S): 500 TABLET, FILM COATED, EXTENDED RELEASE ORAL at 05:09

## 2023-05-08 RX ADMIN — CEFEPIME 100 MILLIGRAM(S): 1 INJECTION, POWDER, FOR SOLUTION INTRAMUSCULAR; INTRAVENOUS at 05:07

## 2023-05-08 RX ADMIN — RANOLAZINE 500 MILLIGRAM(S): 500 TABLET, FILM COATED, EXTENDED RELEASE ORAL at 18:00

## 2023-05-08 RX ADMIN — CHLORHEXIDINE GLUCONATE 1 APPLICATION(S): 213 SOLUTION TOPICAL at 05:09

## 2023-05-08 RX ADMIN — LOSARTAN POTASSIUM 100 MILLIGRAM(S): 100 TABLET, FILM COATED ORAL at 05:09

## 2023-05-08 RX ADMIN — Medication 650 MILLIGRAM(S): at 06:55

## 2023-05-08 RX ADMIN — ATORVASTATIN CALCIUM 40 MILLIGRAM(S): 80 TABLET, FILM COATED ORAL at 21:10

## 2023-05-08 RX ADMIN — Medication 100 MILLIGRAM(S): at 05:09

## 2023-05-08 RX ADMIN — CEFEPIME 100 MILLIGRAM(S): 1 INJECTION, POWDER, FOR SOLUTION INTRAMUSCULAR; INTRAVENOUS at 18:00

## 2023-05-08 RX ADMIN — Medication 650 MILLIGRAM(S): at 05:10

## 2023-05-08 RX ADMIN — Medication 100 MILLIGRAM(S): at 21:10

## 2023-05-08 NOTE — PROGRESS NOTE ADULT - SUBJECTIVE AND OBJECTIVE BOX
GENERAL SURGERY PROGRESS NOTE    Patient: SACKS, TERESA , 52y ([birth year])Female   MRN: [id number]  Location: [place] [address] A  Visit: 05-06-23 Inpatient  Date: 05-08-23 @ 02:58    Patient seen and evaluated at bedside with attending. Pain is well controlled, denies nausea, vomiting, fever, and chills. She is hemodynamically stable, afebrile, and voiding spontaneously. (+) flatus, (-) BM    PAST MEDICAL & SURGICAL HISTORY:  Diabetes      Hypertension      GERD (gastroesophageal reflux disease)      No significant past surgical history          Vitals:   T(F): 98.2 (05-07-23 @ 21:35), Max: 98.5 (05-07-23 @ 14:38)  HR: 62 (05-07-23 @ 21:35)  BP: 115/57 (05-07-23 @ 21:35)  RR: 18 (05-07-23 @ 21:35)  SpO2: --      Diet, NPO after Midnight:      NPO Start Date: 07-May-2023,   NPO Start Time: 23:59  Diet, Clear Liquid:   Consistent Carbohydrate No Snacks  No Red Dye      Fluids: lactated ringers.: Solution, 1000 milliLiter(s) infuse at 115 mL/Hr      I & O's:    Bowel Movement: : [] YES [x] NO  Flatus: : [x] YES [] NO    PHYSICAL EXAM:  General: NAD, AAOx3, calm and cooperative  HEENT: NCAT  Cardiac: No peripheral cyanosis or pallor, extremities well perfused   Respiratory: Non-labored breathing, equal chest rise bilaterally   Abdomen: Soft, non-distended, non-tender, no rebound, no guarding  Musculoskeletal: Strength 5/5 BL UE/LE, ROM intact, compartments soft  Neuro: Sensation grossly intact and equal throughout, no focal deficits  Vascular: Pulses 2+ throughout, extremities well perfused  Skin: Warm/dry, normal color, no jaundice  Incision/wound: healing well, dressings in place, clean, dry and intact    MEDICATIONS  (STANDING):  amLODIPine   Tablet 10 milliGRAM(s) Oral at bedtime  atorvastatin 40 milliGRAM(s) Oral at bedtime  cefepime   IVPB 2000 milliGRAM(s) IV Intermittent every 12 hours  chlorhexidine 2% Cloths 1 Application(s) Topical <User Schedule>  dextrose 5%. 1000 milliLiter(s) (50 mL/Hr) IV Continuous <Continuous>  dextrose 50% Injectable 25 Gram(s) IV Push once  glucagon  Injectable 1 milliGRAM(s) IntraMuscular once  hydrochlorothiazide 25 milliGRAM(s) Oral once  insulin lispro (ADMELOG) corrective regimen sliding scale   SubCutaneous three times a day before meals  lactated ringers. 1000 milliLiter(s) (115 mL/Hr) IV Continuous <Continuous>  losartan 100 milliGRAM(s) Oral daily  metroNIDAZOLE  IVPB 500 milliGRAM(s) IV Intermittent every 8 hours  pantoprazole  Injectable 40 milliGRAM(s) IV Push daily  potassium chloride    Tablet ER 20 milliEquivalent(s) Oral once  ranolazine 500 milliGRAM(s) Oral two times a day    MEDICATIONS  (PRN):  acetaminophen     Tablet .. 650 milliGRAM(s) Oral every 6 hours PRN Temp greater or equal to 38C (100.4F), Mild Pain (1 - 3)  aluminum hydroxide/magnesium hydroxide/simethicone Suspension 30 milliLiter(s) Oral every 4 hours PRN Dyspepsia  dextrose Oral Gel 15 Gram(s) Oral once PRN Blood Glucose LESS THAN 70 milliGRAM(s)/deciliter  melatonin 3 milliGRAM(s) Oral at bedtime PRN Insomnia  ondansetron Injectable 4 milliGRAM(s) IV Push every 8 hours PRN Nausea and/or Vomiting    DVT PROPHYLAXIS:   GI PROPHYLAXIS: pantoprazole  Injectable 40 milliGRAM(s) IV Push daily    ANTICOAGULATION:   ANTIBIOTICS:  cefepime   IVPB 2000 milliGRAM(s)  metroNIDAZOLE  IVPB 500 milliGRAM(s)    LAB/STUDIES:  Labs:  CAPILLARY BLOOD GLUCOSE    POCT Blood Glucose.: 92 mg/dL (07 May 2023 21:54)  POCT Blood Glucose.: 132 mg/dL (07 May 2023 16:41)  POCT Blood Glucose.: 116 mg/dL (07 May 2023 11:51)  POCT Blood Glucose.: 111 mg/dL (07 May 2023 08:06)               13.0   5.66  )-----------( 270      ( 07 May 2023 09:26 )             40.0       Auto Neutrophil %: 53.4 % (05-07-23 @ 09:26)  Auto Immature Granulocyte %: 0.2 % (05-07-23 @ 09:26)    05-07    137  |  104  |  12  ----------------------------<  90  3.5   |  23  |  0.6<L>    Calcium, Total Serum: 8.7 mg/dL (05-07-23 @ 21:23)    LFTs:             7.0  | 1.2  | 400      ------------------[168     ( 07 May 2023 09:26 )  3.4  | x    | 517         Lipase:x      Amylase:x           Coags:     12.70  ----< 1.11    ( 06 May 2023 13:04 )     36.0      Culture - Blood (collected 06 May 2023 16:56)  Source: .Blood Blood  Preliminary Report (07 May 2023 23:02):  No growth to date.    IMAGING:    ACCESS/ DEVICES:  [x] Peripheral IV  [ ] Central Venous Line	[ ] R	[ ] L	[ ] IJ	[ ] Fem	[ ] SC	Placed:   [ ] Arterial Line		[ ] R	[ ] L	[ ] Fem	[ ] Rad	[ ] Ax	Placed:   [ ] PICC:					[ ] Mediport  [ ] Urinary Catheter,  Date Placed:   [ ] Chest tube: [ ] Right, [ ] Left  [ ] JASON/Tod Drains

## 2023-05-08 NOTE — PROGRESS NOTE ADULT - SUBJECTIVE AND OBJECTIVE BOX
TERESA SACKS 52y Female  MRN#: [id number]     Hospital Day: 2d    CC:  Gallstones w/ CBD dilation 1cm    HOSPITAL COURSE:   HPI:  52F. PMH: HTN, DM (on Tresiba), GERD  Transferred from  5/6. Initially presented c/o intermittent upper mid-abdominal pain and intermittent episodes of vomiting x1wk.  Pt denies chest pain, fever, SOB, recent foreign travels, lightheadedness, dysuria, hematuria, bloody stools  -admitted with LFT 1/247/428/555 LFT today 1.2/168/368/491, INR normal   CTAP: Gallstones and wall thickening with trace pericholecystic fluid seen on US, CBD dilation 1cm  Surgery and GI on board- planned for ERCP 5/8      SUBJECTIVE     Overnight events  None    Subjective complaints                                               ----------------------------------------------------------  OBJECTIVE  PAST MEDICAL & SURGICAL HISTORY  Diabetes    Hypertension    GERD (gastroesophageal reflux disease)    No significant past surgical history                                              -----------------------------------------------------------  ALLERGIES:  No Known Allergies                                            ------------------------------------------------------------    HOME MEDICATIONS  Home Medications:  AMLOD/ATORVA 10-40MG TAB:  (06 May 2023 17:30)  HYDROCHLOROT 25MG TAB:  (06 May 2023 17:30)  JANUMET 50-1,000 MG TABLET: TAKE 1 TABLET BY MOUTH EVERY DAY (06 May 2023 17:30)  JARDIANCE 25 MG TABLET: TAKE 1 TABLET BY MOUTH EVERY DAY (06 May 2023 17:30)  LOSARTAN 100MG TAB:  (06 May 2023 17:30)  PANTOPRAZOLE 40MG DR TAB:  (06 May 2023 17:30)  RANOLAZINE 500MG ER TAB:  (06 May 2023 17:30)  TRESIBA FLEX 100U/ML PEN:  (06 May 2023 18:02)  TRULICITY 1.5 MG/0.5 ML PEN: INJECT 0.5 ML SUBCUTANEOUSLY ONE TIME PER WEEK (06 May 2023 17:30)                           MEDICATIONS:  STANDING MEDICATIONS  amLODIPine   Tablet 10 milliGRAM(s) Oral at bedtime  atorvastatin 40 milliGRAM(s) Oral at bedtime  cefepime   IVPB 2000 milliGRAM(s) IV Intermittent every 12 hours  chlorhexidine 2% Cloths 1 Application(s) Topical <User Schedule>  dextrose 5%. 1000 milliLiter(s) IV Continuous <Continuous>  dextrose 50% Injectable 25 Gram(s) IV Push once  glucagon  Injectable 1 milliGRAM(s) IntraMuscular once  hydrochlorothiazide 25 milliGRAM(s) Oral once  insulin lispro (ADMELOG) corrective regimen sliding scale   SubCutaneous three times a day before meals  lactated ringers. 1000 milliLiter(s) IV Continuous <Continuous>  losartan 100 milliGRAM(s) Oral daily  metroNIDAZOLE  IVPB 500 milliGRAM(s) IV Intermittent every 8 hours  pantoprazole  Injectable 40 milliGRAM(s) IV Push daily  potassium chloride    Tablet ER 20 milliEquivalent(s) Oral once  ranolazine 500 milliGRAM(s) Oral two times a day    PRN MEDICATIONS  acetaminophen     Tablet .. 650 milliGRAM(s) Oral every 6 hours PRN  aluminum hydroxide/magnesium hydroxide/simethicone Suspension 30 milliLiter(s) Oral every 4 hours PRN  dextrose Oral Gel 15 Gram(s) Oral once PRN  melatonin 3 milliGRAM(s) Oral at bedtime PRN  ondansetron Injectable 4 milliGRAM(s) IV Push every 8 hours PRN                                            ------------------------------------------------------------  VITAL SIGNS: Last 24 Hours  T(C): 36.8 (07 May 2023 21:35), Max: 36.9 (07 May 2023 14:38)  T(F): 98.2 (07 May 2023 21:35), Max: 98.5 (07 May 2023 14:38)  HR: 62 (07 May 2023 21:35) (62 - 69)  BP: 115/57 (07 May 2023 21:35) (115/57 - 125/60)  BP(mean): --  RR: 18 (07 May 2023 21:35) (18 - 18)  SpO2: --                                             --------------------------------------------------------------  LABS:                        13.0   5.66  )-----------( 270      ( 07 May 2023 09:26 )             40.0     05-07    137  |  104  |  12  ----------------------------<  90  3.5   |  23  |  0.6<L>    Ca    8.7      07 May 2023 21:23  Phos  3.7     05-07  Mg     1.9     05-07    TPro  7.0  /  Alb  3.4<L>  /  TBili  1.2  /  DBili  x   /  AST  400<H>  /  ALT  517<H>  /  AlkPhos  168<H>  05-07    PT/INR - ( 06 May 2023 13:04 )   PT: 12.70 sec;   INR: 1.11 ratio         PTT - ( 06 May 2023 13:04 )  PTT:36.0 sec            Culture - Blood (collected 06 May 2023 16:56)  Source: .Blood Blood  Preliminary Report (07 May 2023 23:02):    No growth to date.                                                    -------------------------------------------------------------  RADIOLOGY:                                            --------------------------------------------------------------    PHYSICAL EXAM:                                             --------------------------------------------------------------                 TERESA SACKS 52y Female  MRN#: [id number]     Hospital Day: 2d    CC:  Gallstones w/ CBD dilation 1cm    HOSPITAL COURSE:   HPI:  52F. PMH: HTN, DM (on Tresiba), GERD  Transferred from  5/6. Initially presented c/o intermittent upper mid-abdominal pain and intermittent episodes of vomiting x1wk.  Pt denies chest pain, fever, SOB, recent foreign travels, lightheadedness, dysuria, hematuria, bloody stools  -admitted with LFT 1/247/428/555 LFT today 1.2/168/368/491, INR normal   CTAP: Gallstones and wall thickening with trace pericholecystic fluid seen on US, CBD dilation 1cm  Surgery and GI on board- planned for ERCP 5/8      SUBJECTIVE     Overnight events  None    Subjective complaints   Pt evaluated at bedside. reports 1 episode of emesis- small amount. Denies abdominal pain.                                             ----------------------------------------------------------  OBJECTIVE  PAST MEDICAL & SURGICAL HISTORY  Diabetes    Hypertension    GERD (gastroesophageal reflux disease)    No significant past surgical history                                              -----------------------------------------------------------  ALLERGIES:  No Known Allergies                                            ------------------------------------------------------------    HOME MEDICATIONS  Home Medications:  AMLOD/ATORVA 10-40MG TAB:  (06 May 2023 17:30)  HYDROCHLOROT 25MG TAB:  (06 May 2023 17:30)  JANUMET 50-1,000 MG TABLET: TAKE 1 TABLET BY MOUTH EVERY DAY (06 May 2023 17:30)  JARDIANCE 25 MG TABLET: TAKE 1 TABLET BY MOUTH EVERY DAY (06 May 2023 17:30)  LOSARTAN 100MG TAB:  (06 May 2023 17:30)  PANTOPRAZOLE 40MG DR TAB:  (06 May 2023 17:30)  RANOLAZINE 500MG ER TAB:  (06 May 2023 17:30)  TRESIBA FLEX 100U/ML PEN:  (06 May 2023 18:02)  TRULICITY 1.5 MG/0.5 ML PEN: INJECT 0.5 ML SUBCUTANEOUSLY ONE TIME PER WEEK (06 May 2023 17:30)                           MEDICATIONS:  STANDING MEDICATIONS  amLODIPine   Tablet 10 milliGRAM(s) Oral at bedtime  atorvastatin 40 milliGRAM(s) Oral at bedtime  cefepime   IVPB 2000 milliGRAM(s) IV Intermittent every 12 hours  chlorhexidine 2% Cloths 1 Application(s) Topical <User Schedule>  dextrose 5%. 1000 milliLiter(s) IV Continuous <Continuous>  dextrose 50% Injectable 25 Gram(s) IV Push once  glucagon  Injectable 1 milliGRAM(s) IntraMuscular once  hydrochlorothiazide 25 milliGRAM(s) Oral once  insulin lispro (ADMELOG) corrective regimen sliding scale   SubCutaneous three times a day before meals  lactated ringers. 1000 milliLiter(s) IV Continuous <Continuous>  losartan 100 milliGRAM(s) Oral daily  metroNIDAZOLE  IVPB 500 milliGRAM(s) IV Intermittent every 8 hours  pantoprazole  Injectable 40 milliGRAM(s) IV Push daily  potassium chloride    Tablet ER 20 milliEquivalent(s) Oral once  ranolazine 500 milliGRAM(s) Oral two times a day    PRN MEDICATIONS  acetaminophen     Tablet .. 650 milliGRAM(s) Oral every 6 hours PRN  aluminum hydroxide/magnesium hydroxide/simethicone Suspension 30 milliLiter(s) Oral every 4 hours PRN  dextrose Oral Gel 15 Gram(s) Oral once PRN  melatonin 3 milliGRAM(s) Oral at bedtime PRN  ondansetron Injectable 4 milliGRAM(s) IV Push every 8 hours PRN                                            ------------------------------------------------------------  VITAL SIGNS: Last 24 Hours  T(C): 36.8 (07 May 2023 21:35), Max: 36.9 (07 May 2023 14:38)  T(F): 98.2 (07 May 2023 21:35), Max: 98.5 (07 May 2023 14:38)  HR: 62 (07 May 2023 21:35) (62 - 69)  BP: 115/57 (07 May 2023 21:35) (115/57 - 125/60)  BP(mean): --  RR: 18 (07 May 2023 21:35) (18 - 18)  SpO2: --                                             --------------------------------------------------------------  LABS:                        13.0   5.66  )-----------( 270      ( 07 May 2023 09:26 )             40.0     05-07    137  |  104  |  12  ----------------------------<  90  3.5   |  23  |  0.6<L>    Ca    8.7      07 May 2023 21:23  Phos  3.7     05-07  Mg     1.9     05-07    TPro  7.0  /  Alb  3.4<L>  /  TBili  1.2  /  DBili  x   /  AST  400<H>  /  ALT  517<H>  /  AlkPhos  168<H>  05-07    PT/INR - ( 06 May 2023 13:04 )   PT: 12.70 sec;   INR: 1.11 ratio         PTT - ( 06 May 2023 13:04 )  PTT:36.0 sec            Culture - Blood (collected 06 May 2023 16:56)  Source: .Blood Blood  Preliminary Report (07 May 2023 23:02):    No growth to date.                                                    -------------------------------------------------------------  RADIOLOGY:                                            --------------------------------------------------------------    PHYSICAL EXAM:  GEN: NAD  NEURO: Alert & Orientedx4, no gross focal deficit  HEENT: nontraumatic, normocephalic, neck supple  CARD: S1, S2 audible, no S3, regular rate and rhythm, no murmur  PULM: B/L breath sounds, no wheezing, crackles, or rales  ABD: Soft, nondistended, nontender, Coronel sign (-)  EXTR: No clubbing, cyanosis, edema.                                            --------------------------------------------------------------

## 2023-05-08 NOTE — CONSULT NOTE ADULT - SUBJECTIVE AND OBJECTIVE BOX
Date of Admission:    CHIEF COMPLAINT: Abd pain    HISTORY OF PRESENT ILLNESS: 52yFemale with PMH below presented to the hospital for GB attack going for ERCP and possible surgery    PAST MEDICAL & SURGICAL HISTORY:  Diabetes      Hypertension      GERD (gastroesophageal reflux disease)      No significant past surgical history        HEALTH ISSUES - PROBLEM Dx:        FAMILY HISTORY:  FH: HTN (hypertension) (Father)      Allergies    No Known Allergies    Intolerances    	  Home Medications:  AMLOD/ATORVA 10-40MG TAB:  (06 May 2023 17:30)  HYDROCHLOROT 25MG TAB:  (06 May 2023 17:30)  JANUMET 50-1,000 MG TABLET: TAKE 1 TABLET BY MOUTH EVERY DAY (06 May 2023 17:30)  JARDIANCE 25 MG TABLET: TAKE 1 TABLET BY MOUTH EVERY DAY (06 May 2023 17:30)  LOSARTAN 100MG TAB:  (06 May 2023 17:30)  PANTOPRAZOLE 40MG DR TAB:  (06 May 2023 17:30)  RANOLAZINE 500MG ER TAB:  (06 May 2023 17:30)  TRESIBA FLEX 100U/ML PEN:  (06 May 2023 18:02)  TRULICITY 1.5 MG/0.5 ML PEN: INJECT 0.5 ML SUBCUTANEOUSLY ONE TIME PER WEEK (06 May 2023 17:30)    MEDICATIONS  (STANDING):  amLODIPine   Tablet 10 milliGRAM(s) Oral at bedtime  atorvastatin 40 milliGRAM(s) Oral at bedtime  cefepime   IVPB 2000 milliGRAM(s) IV Intermittent every 12 hours  chlorhexidine 2% Cloths 1 Application(s) Topical <User Schedule>  dextrose 5%. 1000 milliLiter(s) (50 mL/Hr) IV Continuous <Continuous>  dextrose 50% Injectable 25 Gram(s) IV Push once  glucagon  Injectable 1 milliGRAM(s) IntraMuscular once  hydrochlorothiazide 25 milliGRAM(s) Oral once  insulin lispro (ADMELOG) corrective regimen sliding scale   SubCutaneous three times a day before meals  lactated ringers. 1000 milliLiter(s) (115 mL/Hr) IV Continuous <Continuous>  losartan 100 milliGRAM(s) Oral daily  metroNIDAZOLE  IVPB 500 milliGRAM(s) IV Intermittent every 8 hours  pantoprazole    Tablet 40 milliGRAM(s) Oral before breakfast  potassium chloride    Tablet ER 20 milliEquivalent(s) Oral once  ranolazine 500 milliGRAM(s) Oral two times a day    MEDICATIONS  (PRN):  acetaminophen     Tablet .. 650 milliGRAM(s) Oral every 6 hours PRN Temp greater or equal to 38C (100.4F), Mild Pain (1 - 3)  aluminum hydroxide/magnesium hydroxide/simethicone Suspension 30 milliLiter(s) Oral every 4 hours PRN Dyspepsia  dextrose Oral Gel 15 Gram(s) Oral once PRN Blood Glucose LESS THAN 70 milliGRAM(s)/deciliter  melatonin 3 milliGRAM(s) Oral at bedtime PRN Insomnia  ondansetron Injectable 4 milliGRAM(s) IV Push every 8 hours PRN Nausea and/or Vomiting              SOCIAL HISTORY:    [ ] Non-smoker  [ ] Smoker  [ ] Alcohol      REVIEW OF SYSTEMS:  CONSTITUTIONAL: No fever, weight loss, or fatigue  CARDIOLOGY: PAtient denies chest pain, shortness of breath or syncopal episodes.   RESPIRATORY: denies shortness of breath, wheezeing.   NEUROLOGICAL: NO weakness, no focal deficits to report.  ENDOCRINOLOGICAL: no recent change in diabetic medications.   GI: no BRBPR, no N,V,diarrhea.    PSYCHIATRY: normal mood and affect  HEENT: no nasal discharge, no ecchymosis  SKIN: no ecchymosis, no breakdown  MUSCULOSKELETAL: Full range of motion x4.      PHYSICAL EXAM:  T(C): 36.8 (05-08-23 @ 09:04), Max: 36.9 (05-07-23 @ 14:38)  HR: 63 (05-08-23 @ 09:04) (62 - 73)  BP: 100/51 (05-08-23 @ 09:04) (100/51 - 125/60)  RR: 18 (05-08-23 @ 09:04) (18 - 18)  SpO2: 97% (05-08-23 @ 09:04) (97% - 97%)  Wt(kg): --  I&O's Summary    Daily Height in cm: 170.2 (08 May 2023 09:04)    Daily     General Appearance: Normal	  Cardiovascular: Normal S1 S2, No JVD, No murmurs, No edema  Respiratory: Lungs clear to auscultation	  Psychiatry: A & O x 3, Mood & affect appropriate  Gastrointestinal:  Soft, Non-tender  Skin: No rashes, No ecchymoses, No cyanosis	  Neurologic: Non-focal  Extremities: Normal range of motion, No clubbing, cyanosis or edema  Vascular: Peripheral pulses palpable 2+ bilaterally        LABS:	 	                          13.0   4.56  )-----------( 259      ( 08 May 2023 07:36 )             39.0     05-08    140  |  107  |  11  ----------------------------<  102<H>  3.7   |  22  |  0.6<L>    Ca    8.9      08 May 2023 07:36  Phos  3.7     05-07  Mg     1.8     05-08    TPro  6.5  /  Alb  3.2<L>  /  TBili  1.2  /  DBili  x   /  AST  406<H>  /  ALT  455<H>  /  AlkPhos  142<H>  05-08        PT/INR - ( 08 May 2023 07:36 )   PT: 14.00 sec;   INR: 1.22 ratio         PTT - ( 08 May 2023 07:36 )  PTT:34.0 sec    proBNP:   Lipid Profile:   HgA1c:   TSH:       CARDIAC MARKERS:            TELEMETRY EVENTS: 	    ECG:  	  RADIOLOGY:  OTHER: 	    PREVIOUS DIAGNOSTIC TESTING:    [ ] Echocardiogram:  [ ]  Catheterization:  [ ] Stress Test:  	  	  ASSESSMENT/PLAN:

## 2023-05-08 NOTE — CHART NOTE - NSCHARTNOTEFT_GEN_A_CORE
PACU ANESTHESIA ADMISSION NOTE      Procedure:   Post op diagnosis:      ____  Intubated  TV:______       Rate: ______      FiO2: ______    __x__  Patent Airway    _x___  Full return of protective reflexes    _x___  Full recovery from anesthesia / back to baseline     Vitals:   T:  36.2         R:   11               BP: 117/74                 Sat:   98                P: 74      Mental Status:  _x___ Awake   __x___ Alert   _____ Drowsy   _____ Sedated    Nausea/Vomiting:  _x___ NO  ______Yes,   See Post - Op Orders          Pain Scale (0-10):  ____x_    Treatment: ____ None    ____ See Post - Op/PCA Orders    Post - Operative Fluids:   ___x_ Oral   ____ See Post - Op Orders    Plan: Discharge:   ____Home       __x___Floor     _____Critical Care    _____  Other:_________________    Comments:
EUS focused examination of the common bile duct: amber-hepatis lymph node measuring 38 x 36 mm, hypoechoic, heterogenous, well defined borders, polygonal.  FNA/FNB was performed. Three passes were made using a trans-duodenal approach.  Rapid onsite evaluation was performed, and the adequacy of the sample was confirmed by cytopathologist.      Plan:   - Await pathology   - Monitor CBC   - Advance diet as tolerated   - Will follow

## 2023-05-08 NOTE — PROGRESS NOTE ADULT - ASSESSMENT
ASSESSMENT:  51 y/o F PMHx DM, HTN, GERD c/o 1 week h/o intermittent upper mid-abdominal pain and intermittent episodes of vomiting. First episode of vomiting was a week ago with small amount observed per pt; last episode of vomiting was this morning on an empty stomach. Pt reports abd pain is 1-2/10 when sxs occur (no known triggers). Pt denies chest pain, fever, SOB, recent foreign travels, lightheadedness, dysuria, hematuria, dark or bloody stools. In ED, labs revealed elevated LFTS, including elevated lipase and d. bili. Imaging demonstrated findings consistent w/ cholecystitis     PLAN:   # Biliary Colic   # Gallstones and wall thickening with trace pericholecystic fluid seen on US, CBD dilation 1 cm on CT ab  # elevated LFTs  - IV cefepime and flagyl  - CLD for now  - GI consulted, likely ERCP this Monday  - trend LFT  - f/u blood Cx  - cards consult - pt on ranexa   - Will plan for laparoscopic cholecystectomy this admission, pending EUS w/ EGD results     General Surgery following, [phone] for questions or concerns

## 2023-05-08 NOTE — PRE-OP CHECKLIST - AS BP NONINV SITE
As of 10-3-19, pt is committed as MI to [facility].   Hatch order includes:  Thorazine, Zyprexa, Risperdal, and Haldol.       left upper arm

## 2023-05-08 NOTE — CONSULT NOTE ADULT - ASSESSMENT
Normal LV function with good exercise capacity  Cleared for ERCP and then Cholecystectomy as low risk
51 y/o F PMHx DM, HTN, GERD c/o 1 week h/o intermittent upper mid-abdominal pain and intermittent episodes of vomiting. First episode of vomiting was a week ago with small amount observed per pt; last episode of vomiting was this morning on an empty stomach. Pt reports abd pain is 1-2/10 when sxs occur (no known triggers). Pt denies chest pain, fever, SOB, recent foreign travels, lightheadedness, dysuria, hematuria, dark or bloody stools. In ED, labs revealed elevated LFTS, including elevated lipase and d. bili. Imaging demonstrated findings consistent w/ cholecystitis     Plan   GI team wants pt [facility]   # Biliary Colic   # Gallstones and wall thickening with trace pericholecystic fluid seen on US, CBD dilation 1 cm on CT ab  # elevated LFTs  - s/p 1 dose cefotetan in ED   - IV start cefepime and flagyl  - CLD for now ,  - GI consulted in ER at , very likely ERCP this Monday  - trend LFT  - f/u blood Cx  - cards consult - pt on ranexa     d/w dr phillip     s/o to radha team [id number]      
51 y/o F PMHx DM, HTN, GERD on PPI once a day here for hematemesis.   As per the patient she has one episode of vomiting with bile and one tea spoon of blood on 4/27 followed by one episode of vomiting without blood on 5/2. Again on 5/6 she had one episode of vomiting half a cup. Denies any abdominal pain, nausea, vomiting, melena, hematochezia, weight loss, h/o NSAID intake, h/o liver disease, liver infections.    #)Hematemesis small episodes-resolved now DD: r/o PUD vs erosive esophagitis  #)h/o GERD on PPI    -No h/o NSAID intake   -hemodynamically stable   -Hb at baseline 13  -JACQUELIN brown stools    Recs:   trend CBC, Keep Hb>8   active type and screen   2 18 gauge   PPI BID  will plan for EGD tomorrow  NPO after midnight     #)Elevated liver enzymes mixed predominantly hepatocellular r/o choledocholithiasis  -No baseline LFT to compare as per patient normal before   -admitted with LFT 1/247/428/555 LFT today 1.2/168/368/491, INR normal   -No h/o Abx or herbal medications   -US abdomen showed CBD 6mm, +GS 7mm, Gallstones and wall thickening with trace pericholecystic fluid.  -CT abdomen showed Mild intra and extrahepatic biliary ductal dilatation, with CBD measuring up to 1 cm and demonstrates an abrupt change of caliber at its distal end. Nonspecific gallbladder wall edema. Cholelithiasis. Right hepatic hypodensity too small to characterize.    Recs:   trend LFT, INR  avoid hepatotoxic medications  check acute hepatitis panel   will plan for EUS tomorrow at the time of EGD  NPO after midnight   surgery evaluation     #)CRC screening   -colonoscopy as an OP never had colonoscopy

## 2023-05-08 NOTE — PROGRESS NOTE ADULT - ASSESSMENT
52F. PMH: HTN, DM (on Tresiba), GERD  Transferred from  5/6. Initially presented c/o intermittent upper mid-abdominal pain and intermittent episodes of vomiting x1wk.  Pt denies chest pain, fever, SOB, recent foreign travels, lightheadedness, dysuria, hematuria, bloody stools  CTAP: Gallstones and wall thickening with trace pericholecystic fluid seen on US, CBD dilation 1cm    #Elevated liver enzymes mixed predominantly hepatocellular r/o choledocholithiasis  #Hematemesis small episodes-resolved now DD: r/o PUD vs erosive esophagitis  #h/o GERD on PPI   *No h/o NSAID intake; JACQUELIN brown stools  -admitted with LFT 1/247/428/555 LFT today 1.2/168/368/491, INR normal   -US abdomen showed CBD 6mm, +GS 7mm, Gallstones and wall thickening with trace pericholecystic fluid.  -CT abdomen showed Mild intra and extrahepatic biliary ductal dilatation, with CBD measuring up to 1 cm and demonstrates an abrupt change of caliber at its distal end. Nonspecific gallbladder wall edema. Cholelithiasis. Right hepatic hypodensity too small to characterize.  *****  - GI and surgery team following  - C/w cefepime (5/6-present) and flagyl (5/6-present)  - C/w tytlenol PRN for pain control  - possible ERCP today; NPO    #HTN  -c/w home meds  hold HCTZ    #DM  - Home meds: takes trulicity, jardiance, janumet and tresiba  - hold PO meds   - ISS - monitor blood sugar    #GERD  - c/w pantoprazole    #Misc  - Diet: NPO  - GI: PPI  - DVT: N/a  - Dispo: Medicine     52F. PMH: HTN, DM (on Tresiba), GERD  Transferred from  5/6. Initially presented c/o intermittent upper mid-abdominal pain and intermittent episodes of vomiting x1wk.  Pt denies chest pain, fever, SOB, recent foreign travels, lightheadedness, dysuria, hematuria, bloody stools  CTAP: Gallstones and wall thickening with trace pericholecystic fluid seen on US, CBD dilation 1cm    #Elevated liver enzymes mixed predominantly hepatocellular r/o choledocholithiasis  #Hematemesis small episodes-resolved now DD: r/o PUD vs erosive esophagitis  #h/o GERD on PPI   *No h/o NSAID intake; JACQUELIN brown stools  -admitted with LFT 1/247/428/555 LFT today 1.2/168/368/491, INR normal   -US abdomen showed CBD 6mm, +GS 7mm, Gallstones and wall thickening with trace pericholecystic fluid.  -CT abdomen showed Mild intra and extrahepatic biliary ductal dilatation, with CBD measuring up to 1 cm and demonstrates an abrupt change of caliber at its distal end. Nonspecific gallbladder wall edema. Cholelithiasis. Right hepatic hypodensity too small to characterize.  *****  - GI and surgery team following - post ERCP f/u surgery for additional plan/recs  - C/w cefepime (5/6-present) and flagyl (5/6-present)  - C/w tytlenol PRN for pain control  - possible ERCP today; NPO    #HTN  -c/w home meds  hold HCTZ    #DM  - Home meds: takes trulicity, jardiance, janumet and tresiba  - hold PO meds   - ISS - monitor blood sugar    #GERD  - c/w pantoprazole    #Misc  - Diet: NPO  - GI: PPI  - DVT: N/a  - Dispo: Medicine

## 2023-05-09 ENCOUNTER — TRANSCRIPTION ENCOUNTER (OUTPATIENT)
Age: 53
End: 2023-05-09

## 2023-05-09 VITALS
TEMPERATURE: 98 F | DIASTOLIC BLOOD PRESSURE: 645 MMHG | SYSTOLIC BLOOD PRESSURE: 113 MMHG | HEART RATE: 95 BPM | OXYGEN SATURATION: 100 % | RESPIRATION RATE: 18 BRPM

## 2023-05-09 LAB
GLUCOSE BLDC GLUCOMTR-MCNC: 176 MG/DL — HIGH (ref 70–99)
SURGICAL PATHOLOGY STUDY: SIGNIFICANT CHANGE UP

## 2023-05-09 PROCEDURE — 99233 SBSQ HOSP IP/OBS HIGH 50: CPT

## 2023-05-09 PROCEDURE — 99238 HOSP IP/OBS DSCHRG MGMT 30/<: CPT

## 2023-05-09 RX ORDER — OXYCODONE HYDROCHLORIDE 5 MG/1
5 TABLET ORAL EVERY 6 HOURS
Refills: 0 | Status: DISCONTINUED | OUTPATIENT
Start: 2023-05-09 | End: 2023-05-09

## 2023-05-09 RX ORDER — METRONIDAZOLE 500 MG
1 TABLET ORAL
Qty: 15 | Refills: 0
Start: 2023-05-09 | End: 2023-05-13

## 2023-05-09 RX ORDER — CIPROFLOXACIN LACTATE 400MG/40ML
1 VIAL (ML) INTRAVENOUS
Qty: 10 | Refills: 0
Start: 2023-05-09 | End: 2023-05-13

## 2023-05-09 RX ADMIN — RANOLAZINE 500 MILLIGRAM(S): 500 TABLET, FILM COATED, EXTENDED RELEASE ORAL at 05:10

## 2023-05-09 RX ADMIN — Medication 100 MILLIGRAM(S): at 05:09

## 2023-05-09 RX ADMIN — Medication 1: at 08:20

## 2023-05-09 RX ADMIN — CEFEPIME 100 MILLIGRAM(S): 1 INJECTION, POWDER, FOR SOLUTION INTRAMUSCULAR; INTRAVENOUS at 05:04

## 2023-05-09 RX ADMIN — CHLORHEXIDINE GLUCONATE 1 APPLICATION(S): 213 SOLUTION TOPICAL at 05:05

## 2023-05-09 NOTE — DISCHARGE NOTE PROVIDER - HOSPITAL COURSE
CC:  Gallstones w/ CBD dilation 1cm    HOSPITAL COURSE:   HPI:  52F. PMH: HTN, DM (on Tresiba), GERD  Transferred from  5/6. Initially presented c/o intermittent upper mid-abdominal pain and intermittent episodes of vomiting x1wk.  Pt denies chest pain, fever, SOB, recent foreign travels, lightheadedness, dysuria, hematuria, bloody stools  -admitted with LFT 1/247/428/555 LFT today 1.2/168/368/491, INR normal   CTAP: Gallstones and wall thickening with trace pericholecystic fluid seen on US, CBD dilation 1cm  Surgery and GI on board- S/p EUS 5/8 - bx obtained, no sludge in GB or CBD so no ERCP done. Results noted below.     #Elevated liver enzymes mixed predominantly hepatocellular r/o choledocholithiasis  #Hematemesis small episodes-resolved now DD: r/o PUD vs erosive esophagitis  #h/o GERD on PPI   *No h/o NSAID intake; JACQUELIN brown stools  -admitted with LFT 1/247/428/555 LFT today 1.2/168/368/491, INR normal   -US abdomen showed CBD 6mm, +GS 7mm, Gallstones and wall thickening with trace pericholecystic fluid.  -CT abdomen showed Mild intra and extrahepatic biliary ductal dilatation, with CBD measuring up to 1 cm and demonstrates an abrupt change of caliber at its distal end. Nonspecific gallbladder wall edema. Cholelithiasis. Right hepatic hypodensity too small to characterize  - EUS (5/8): focused examination of the common bile duct: amber-hepatis lymph node measuring 38 x 36 mm, hypoechoic, heterogenous, well defined borders, polygonal.  FNA/FNB was performed. No findings in GB or CBD to warrant ERCP so no ERCP performed per discussion with GI team.   - Advanced diet 5/8 - pt tolerated well  *****  - GI and surgery team followed  - cefepime (5/6-5/9) and flagyl (5/6-5/9) - will DC w/ 5 days of cipro/flagyl   *****  - OP Gi follow up w/ Dr. Wilbert Herring to discuss pathology- [phone] for appt; Can hold off on CCY for now until pathology results as this could be an infiltrative process   - OP surgery follow up for possible eventual lap radha     #HTN  -c/w home meds  hold HCTZ - resume on discharge     #DM  - Home meds: takes trulicity, jardiance, janumet and tresiba  - Cont home meds on discharge     #GERD  - c/w pantoprazole   CC:  Gallstones w/ CBD dilation 1cm    HOSPITAL COURSE:   HPI:  52F. PMH: HTN, DM (on Tresiba), GERD  Transferred from  5/6. Initially presented c/o intermittent upper mid-abdominal pain and intermittent episodes of vomiting x1wk.  Pt denies chest pain, fever, SOB, recent foreign travels, lightheadedness, dysuria, hematuria, bloody stools  -admitted with LFT 1/247/428/555 LFT today 1.2/168/368/491, INR normal   CTAP: Gallstones and wall thickening with trace pericholecystic fluid seen on US, CBD dilation 1cm   Mild intra and extrahepatic biliary ductal dilatation,   with CBD measuring up to 1 cm and demonstrates an abrupt change of   caliber at its distal end. No definite choledocholithiasis identified.   Nonspecific gallbladder wall edema. Cholelithiasis. Right hepatic   hypodensity too small to characterize..  Surgery and GI on board- S/p EUS 5/8 - bx obtained, no sludge in GB or CBD so no ERCP done. Results noted below.     A&P    #Elevated liver enzymes mixed predominantly hepatocellular  - r/o choledocholithiasis  #Hematemesis small episodes-resolved   #h/o GERD on PPI   *No h/o NSAID intake; JACQUELIN brown stools  -admitted with LFT 1/247/428/555 LFT today 1.2/168/368/491, INR normal   -US abdomen showed CBD 6mm, +GS 7mm, Gallstones and wall thickening with trace pericholecystic fluid.  -CT abdomen showed Mild intra and extrahepatic biliary ductal dilatation, with CBD measuring up to 1 cm and demonstrates an abrupt change of caliber at its distal end. Nonspecific gallbladder wall edema. Cholelithiasis. Right hepatic hypodensity too small to characterize  - EUS (5/8): focused examination of the common bile duct: ambre-hepatis lymph node measuring 38 x 36 mm, hypoechoic, heterogenous, well defined borders, polygonal.  FNA/FNB was performed. No findings in GB or CBD to warrant ERCP so no ERCP performed per discussion with GI team.   - Advanced diet 5/8 - pt tolerated well  *****  - GI and surgery team followed  - cefepime (5/6-5/9) and flagyl (5/6-5/9) - will DC w/ 5 days of cipro/flagyl   *****  - OP Gi follow up w/ Dr. Wilbert Herring to discuss pathology- [phone] for appt;   Can hold off on CCY for now until pathology results as this could be an infiltrative process   - OP surgery follow up for lap radha     #HTN  -c/w home meds  hold HCTZ - resume on discharge     #DM  - Home meds: takes trulicity, jardiance, janumet and tresiba  - Cont home meds on discharge     #GERD  - c/w pantoprazole

## 2023-05-09 NOTE — DISCHARGE NOTE PROVIDER - ATTENDING ATTESTATION STATEMENT
Prescription sent to pharmacy.   I have personally seen and examined the patient. I have collaborated with and supervised the

## 2023-05-09 NOTE — DISCHARGE NOTE PROVIDER - NSDCMRMEDTOKEN_GEN_ALL_CORE_FT
AMLOD/ATORVA 10-40MG TAB:   HYDROCHLOROT 25MG TAB:   JANUMET 50-1,000 MG TABLET: TAKE 1 TABLET BY MOUTH EVERY DAY  JARDIANCE 25 MG TABLET: TAKE 1 TABLET BY MOUTH EVERY DAY  LOSARTAN 100MG TAB:   PANTOPRAZOLE 40MG DR TAB:   RANOLAZINE 500MG ER TAB:   TRESIBA FLEX 100U/ML PEN:   TRULICITY 1.5 MG/0.5 ML PEN: INJECT 0.5 ML SUBCUTANEOUSLY ONE TIME PER WEEK   AMLOD/ATORVA 10-40MG TAB:   ciprofloxacin 500 mg oral tablet: 1 tab(s) orally 2 times a day  HYDROCHLOROT 25MG TAB:   JANUMET 50-1,000 MG TABLET: TAKE 1 TABLET BY MOUTH EVERY DAY  JARDIANCE 25 MG TABLET: TAKE 1 TABLET BY MOUTH EVERY DAY  LOSARTAN 100MG TAB:   metroNIDAZOLE 500 mg oral tablet: 1 tab(s) orally every 8 hours  PANTOPRAZOLE 40MG DR TAB:   RANOLAZINE 500MG ER TAB:   TRESIBA FLEX 100U/ML PEN:   TRULICITY 1.5 MG/0.5 ML PEN: INJECT 0.5 ML SUBCUTANEOUSLY ONE TIME PER WEEK

## 2023-05-09 NOTE — DISCHARGE NOTE NURSING/CASE MANAGEMENT/SOCIAL WORK - NSDCPEFALRISK_GEN_ALL_CORE
For information on Fall & Injury Prevention, visit: [email or web address] OR  [email or web address] OR  https://www.cdc.gov/steadi/patient.html

## 2023-05-09 NOTE — PROGRESS NOTE ADULT - ASSESSMENT
ASSESSMENT:  51 y/o F PMHx DM, HTN, GERD c/o 1 week h/o intermittent upper mid-abdominal pain and intermittent episodes of vomiting. First episode of vomiting was a week ago with small amount observed per pt; last episode of vomiting was this morning on an empty stomach. Pt reports abd pain is 1-2/10 when sxs occur (no known triggers). Pt denies chest pain, fever, SOB, recent foreign travels, lightheadedness, dysuria, hematuria, dark or bloody stools. In ED, labs revealed elevated LFTS, including elevated lipase and d. bili. Imaging demonstrated findings consistent w/ cholecystitis     PLAN:   - Plan for lap radha on this admission   - Pre op   - NPO, IVF MN   - Pain control   - Cards: low risk   - Care as per primary team    ASSESSMENT:  51 y/o F PMHx DM, HTN, GERD c/o 1 week h/o intermittent upper mid-abdominal pain and intermittent episodes of vomiting. First episode of vomiting was a week ago with small amount observed per pt; last episode of vomiting was this morning on an empty stomach. Pt reports abd pain is 1-2/10 when sxs occur (no known triggers). Pt denies chest pain, fever, SOB, recent foreign travels, lightheadedness, dysuria, hematuria, dark or bloody stools. In ED, labs revealed elevated LFTS, including elevated lipase and d. bili. Imaging demonstrated findings consistent w/ cholecystitis     PLAN:   -Enlarged portahepatic lymph node identified on EUS/EGD. No CBD stones. Patient's pain has improved and is tolerating diet.   - Pain control   - Cards: low risk   - Care as per primary team    ASSESSMENT:  51 y/o F PMHx DM, HTN, GERD c/o 1 week h/o intermittent upper mid-abdominal pain and intermittent episodes of vomiting. First episode of vomiting was a week ago with small amount observed per pt; last episode of vomiting was this morning on an empty stomach. Pt reports abd pain is 1-2/10 when sxs occur (no known triggers). Pt denies chest pain, fever, SOB, recent foreign travels, lightheadedness, dysuria, hematuria, dark or bloody stools. In ED, labs revealed elevated LFTS, including elevated lipase and d. bili. Imaging demonstrated findings consistent w/ cholecystitis     PLAN:   -Enlarged portahepatic lymph node identified on EUS/EGD. No CBD stones. Patient's pain has improved and is tolerating diet  -Plan for outpatient CCY, patient will follow up with Dr. Call outpatient for planning  - Pain control   - Cards: low risk   - Care as per primary team

## 2023-05-09 NOTE — DISCHARGE NOTE PROVIDER - CARE PROVIDER_API CALL
Pepe Landa)  Gastroenterology; Internal Medicine  [address] [place] [address]  Phone: [phone]  Fax: [phone]  Follow Up Time: 2 weeks    Maria Luisa Call)  Surgery  [address] [place] [address]  Phone: [phone]  Fax: [phone]  Follow Up Time: 2 weeks    Dony Casas)  Infectious Disease; Internal Medicine  [address] [place] [address]  Phone: [phone]  Fax: [phone]  Follow Up Time: 2 weeks

## 2023-05-09 NOTE — DISCHARGE NOTE NURSING/CASE MANAGEMENT/SOCIAL WORK - PATIENT PORTAL LINK FT
You can access the FollowMyHealth Patient Portal offered by [facility] by registering at the following website: [email or web address]. By joining [employer]’s FollowMyHealth portal, you will also be able to view your health information using other applications (apps) compatible with our system.

## 2023-05-09 NOTE — PROGRESS NOTE ADULT - ATTENDING COMMENTS
above noted discussed case with surgical resident and pt abdomen soft no distension/tenderness for ERCP
above noted discussed case with surgical resident and pt abdomen soft no distension/tenderness for ERCP in am
53 y/o F PMHx DM, HTN, GERD presenting with intermittent upper mid-abdominal pain and intermittent episodes of vomiting.     # choledocholithiasis and cholelithiasis  # transamnitis- due to above  -possible cholecysitis  GI and surgery team following  continue antibiotics  tylenol PRN for pain control  patient had ERCP- pending results  GI planning for cholecystectomy during this admission  monitor LFT    #HTN  HCTZ, amlodipine, losartan- hold for BP lower than 120/60    #DM  - hold PO meds   -ISS - monitor blood sugar  - takes trulicity, jardiance, janumet and tresiba at home    #GERD  - c/w pantoprazole    DVT px- patient ambulatory
above noted discussed case with surgical resident and pt abdomen soft n0 distension/tenderness ERCP findings noted discussed case with Gi

## 2023-05-09 NOTE — DISCHARGE NOTE PROVIDER - NSDCCPCAREPLAN_GEN_ALL_CORE_FT
PRINCIPAL DISCHARGE DIAGNOSIS  Diagnosis: Choledocholithiasis with acute cholecystitis  Assessment and Plan of Treatment: You came to the hospital with vomiting and were found to have elevated liver enzymes and evidence on CT scan of gallbladder stones. The gastroenterology team evaluated you and performed an ultrasound, with a biopsy of a lymph node. They did not find evidence of a stone to remove during the procedure, and it may be possible the stone resolved on its own. It is important to follow up with Dr. Wilbert Herring ([phone]) for pathology results. We are treating you with antibiotics as your symptoms may be related to an infection of the gallbladder as well.   Please take all medications as prescribed and follow up with your outpatient providers for further management of your care. If you experience worsening of symptoms or new symptoms please return to the ED.  To view your hospital results create a Patient Portal at the following link: [email or web address] or search "Patient Portal [facility]" and follow the instructions on the FollowHealth page.

## 2023-05-09 NOTE — PROGRESS NOTE ADULT - ASSESSMENT
Patient is a 51 y/o F PMHx DM, HTN, GERD on PPI once a day here for hematemesis. She had Endoscopic work up 5/8 with no ulcers or bleeding noted on EGD, EUS notable for a larger rin-hepatis lymph node measuring 38 x 36 mm, hypoechoic, heterogenous, well defined borders, polygonal.  FNA/FNB was performed. ERCP was not done as ducts without CBD stones- Of note Gallbladder does have stones. Currently feels well without any abdominal pain, nausea, emesis, or fevers.     Hematemesis small episodes-resolved now   -No h/o NSAID intake   -hemodynamically stable   -Hb at baseline 13  -JACQUELIN brown stools  -PPI daily    Elevated liver enzymes / EUS done- FNA of Rin Hep[address]is LN  -No h/o Abx or herbal medications   -US abdomen showed CBD 6mm, +GS 7mm, Gallstones and wall thickening with trace pericholecystic fluid.  -CT abdomen showed Mild intra and extrahepatic biliary ductal dilatation, with CBD measuring up to 1 cm and demonstrates an abrupt change of caliber at its distal end. Nonspecific gallbladder wall edema. Cholelithiasis. Right hepatic hypodensity too small to characterize.  -Avoid hepatotoxic medications           Patient is a 53 y/o F PMHx DM, HTN, GERD on PPI once a day here for hematemesis. She had Endoscopic work up 5/8 with no ulcers or bleeding noted on EGD, EUS notable for a larger festus-hepatis lymph node measuring 38 x 36 mm, hypoechoic, heterogenous, well defined borders, polygonal.  FNA/FNB was performed. ERCP was not done as ducts without CBD stones- Of note Gallbladder does have stones. Currently feels well without any abdominal pain, nausea, emesis, or fevers.     Hematemesis small episodes-resolved now   -No h/o NSAID intake   -hemodynamically stable   -Hb at baseline 13  -JACQUELIN brown stools  -PPI daily    Elevated liver enzymes / EUS done- FNA of Rin Hepatis LN  -No h/o Abx or herbal medications   -US abdomen showed CBD 6mm, +GS 7mm, Gallstones and wall thickening with trace pericholecystic fluid.  -CT abdomen showed Mild intra and extrahepatic biliary ductal dilatation, with CBD measuring up to 1 cm and demonstrates an abrupt change of caliber at its distal end. Nonspecific gallbladder wall edema. Cholelithiasis. Right hepatic hypodensity too small to characterize.  -Avoid hepatotoxic medications  -Follow up stressed with Dr. Wilbert Herring to discuss pathology- [phone] for appt  -Can hold off on CCY for now until pathology results as this could be an infiltrative process

## 2023-05-09 NOTE — PROGRESS NOTE ADULT - SUBJECTIVE AND OBJECTIVE BOX
Patient is a 53 y/o F PMHx DM, HTN, GERD on PPI once a day here for hematemesis. She had Endoscopic work up 5/8 with no ulcers or bleeding noted on EGD, EUS notable for a larger francine-hepatis lymph node measuring 38 x 36 mm, hypoechoic, heterogenous, well defined borders, polygonal.  FNA/FNB was performed. ERCP was not done as ducts without CBD stones- Of note Gallbladder does have stones. Currently feels well without any abdominal pain, nausea, emesis, or fevers.       PAST MEDICAL & SURGICAL HISTORY:  Diabetes  Hypertension  GERD (gastroesophageal reflux disease)      Home Medications:  AMLOD/ATORVA 10-40MG TAB:  (06 May 2023 17:30)  HYDROCHLOROT 25MG TAB:  (06 May 2023 17:30)  JANUMET 50-1,000 MG TABLET: TAKE 1 TABLET BY MOUTH EVERY DAY (06 May 2023 17:30)  JARDIANCE 25 MG TABLET: TAKE 1 TABLET BY MOUTH EVERY DAY (06 May 2023 17:30)  LOSARTAN 100MG TAB:  (06 May 2023 17:30)  PANTOPRAZOLE 40MG DR TAB:  (06 May 2023 17:30)  RANOLAZINE 500MG ER TAB:  (06 May 2023 17:30)  TRESIBA FLEX 100U/ML PEN:  (06 May 2023 18:02)  TRULICITY 1.5 MG/0.5 ML PEN: INJECT 0.5 ML SUBCUTANEOUSLY ONE TIME PER WEEK (06 May 2023 17:30)    MEDICATIONS  (STANDING):  amLODIPine   Tablet 10 milliGRAM(s) Oral at bedtime  atorvastatin 40 milliGRAM(s) Oral at bedtime  cefepime   IVPB 2000 milliGRAM(s) IV Intermittent every 12 hours  chlorhexidine 2% Cloths 1 Application(s) Topical <User Schedule>  dextrose 5%. 1000 milliLiter(s) (50 mL/Hr) IV Continuous <Continuous>  dextrose 50% Injectable 25 Gram(s) IV Push once  glucagon  Injectable 1 milliGRAM(s) IntraMuscular once  hydrochlorothiazide 25 milliGRAM(s) Oral once  insulin lispro (ADMELOG) corrective regimen sliding scale   SubCutaneous three times a day before meals  lactated ringers. 1000 milliLiter(s) (115 mL/Hr) IV Continuous <Continuous>  losartan 100 milliGRAM(s) Oral daily  metroNIDAZOLE  IVPB 500 milliGRAM(s) IV Intermittent every 8 hours  pantoprazole  Injectable 40 milliGRAM(s) IV Push daily  ranolazine 500 milliGRAM(s) Oral two times a day    MEDICATIONS  (PRN):  acetaminophen     Tablet .. 650 milliGRAM(s) Oral every 6 hours PRN Temp greater or equal to 38C (100.4F), Mild Pain (1 - 3)  aluminum hydroxide/magnesium hydroxide/simethicone Suspension 30 milliLiter(s) Oral every 4 hours PRN Dyspepsia  dextrose Oral Gel 15 Gram(s) Oral once PRN Blood Glucose LESS THAN 70 milliGRAM(s)/deciliter  melatonin 3 milliGRAM(s) Oral at bedtime PRN Insomnia  ondansetron Injectable 4 milliGRAM(s) IV Push every 8 hours PRN Nausea and/or Vomiting      Allergies  No Known Allergies      Review of Systems:   Constitutional:  No Fever, No Chills  ENT/Mouth:  No Hearing Changes,  No Difficulty Swallowing  Eyes:  No Eye Pain, No Vision Changes  Cardiovascular:  No Chest Pain, No Palpitations  Respiratory:  No Cough, No Dyspnea  Gastrointestinal:  As described in HPI  Musculoskeletal:  No Joint Swelling, No Back Pain  Skin:  No Skin Lesions, No Jaundice  Neuro:  No Syncope, No Dizziness  Heme/Lymph:  No Bruising, No Bleeding.      Vital Signs Last 24 Hrs  T(C): 36.6 (09 May 2023 05:07), Max: 36.8 (08 May 2023 09:04)  T(F): 97.8 (09 May 2023 05:07), Max: 98.3 (08 May 2023 11:59)  HR: 72 (09 May 2023 05:07) (63 - 79)  BP: 95/645 (09 May 2023 05:07) (95/645 - 123/62)  BP(mean): 80 (08 May 2023 15:45) (80 - 80)  RR: 18 (09 May 2023 05:07) (16 - 21)  SpO2: 100% (09 May 2023 05:07) (96% - 100%)    Parameters below as of 08 May 2023 14:17  Patient On (Oxygen Delivery Method): room air      Constitutional: No acute distress.  Eyes:. Conjunctivae are clear, Sclera is non-icteric.  Ears Nose and Throat: The external ears are normal appearing,  Oral mucosa is pink and moist.  Respiratory:  No signs of respiratory distress. Lung sounds are clear bilaterally.  Cardiovascular:  S1 S2, Regular rate and rhythm.  GI: Abdomen is soft, symmetric, and non-tender without distention.  Bowel sounds are present and normoactive in all four quadrants.   Neuro: No Tremor, No involuntary movements  Skin: No rashes, No Jaundice.    Labs:                        13.0   4.56  )-----------( 259      ( 08 May 2023 07:36 )             39.0     05-08    140  |  107  |  11  ----------------------------<  102<H>  3.7   |  22  |  0.6<L>    Ca    8.9      08 May 2023 07:36  Mg     1.8     05-08    TPro  6.5  /  Alb  3.2<L>  /  TBili  1.2  /  DBili  x   /  AST  406<H>  /  ALT  455<H>  /  AlkPhos  142<H>  05-08   Patient is a 53 y/o F PMHx DM, HTN, GERD on PPI once a day here for hematemesis. She had Endoscopic work up 5/8 with no ulcers or bleeding noted on EGD, EUS notable for a larger festus-hepatis lymph node measuring 38 x 36 mm, hypoechoic, heterogenous, well defined borders, polygonal.  FNA/FNB was performed. ERCP was not done as ducts without CBD stones- Of note Gallbladder does have stones. Currently feels well without any abdominal pain, nausea, emesis, or fevers.       PAST MEDICAL & SURGICAL HISTORY:  Diabetes  Hypertension  GERD (gastroesophageal reflux disease)      Home Medications:  AMLOD/ATORVA 10-40MG TAB:  (06 May 2023 17:30)  HYDROCHLOROT 25MG TAB:  (06 May 2023 17:30)  JANUMET 50-1,000 MG TABLET: TAKE 1 TABLET BY MOUTH EVERY DAY (06 May 2023 17:30)  JARDIANCE 25 MG TABLET: TAKE 1 TABLET BY MOUTH EVERY DAY (06 May 2023 17:30)  LOSARTAN 100MG TAB:  (06 May 2023 17:30)  PANTOPRAZOLE 40MG DR TAB:  (06 May 2023 17:30)  RANOLAZINE 500MG ER TAB:  (06 May 2023 17:30)  TRESIBA FLEX 100U/ML PEN:  (06 May 2023 18:02)  TRULICITY 1.5 MG/0.5 ML PEN: INJECT 0.5 ML SUBCUTANEOUSLY ONE TIME PER WEEK (06 May 2023 17:30)    MEDICATIONS  (STANDING):  amLODIPine   Tablet 10 milliGRAM(s) Oral at bedtime  atorvastatin 40 milliGRAM(s) Oral at bedtime  cefepime   IVPB 2000 milliGRAM(s) IV Intermittent every 12 hours  chlorhexidine 2% Cloths 1 Application(s) Topical <User Schedule>  dextrose 5%. 1000 milliLiter(s) (50 mL/Hr) IV Continuous <Continuous>  dextrose 50% Injectable 25 Gram(s) IV Push once  glucagon  Injectable 1 milliGRAM(s) IntraMuscular once  hydrochlorothiazide 25 milliGRAM(s) Oral once  insulin lispro (ADMELOG) corrective regimen sliding scale   SubCutaneous three times a day before meals  lactated ringers. 1000 milliLiter(s) (115 mL/Hr) IV Continuous <Continuous>  losartan 100 milliGRAM(s) Oral daily  metroNIDAZOLE  IVPB 500 milliGRAM(s) IV Intermittent every 8 hours  pantoprazole  Injectable 40 milliGRAM(s) IV Push daily  ranolazine 500 milliGRAM(s) Oral two times a day    MEDICATIONS  (PRN):  acetaminophen     Tablet .. 650 milliGRAM(s) Oral every 6 hours PRN Temp greater or equal to 38C (100.4F), Mild Pain (1 - 3)  aluminum hydroxide/magnesium hydroxide/simethicone Suspension 30 milliLiter(s) Oral every 4 hours PRN Dyspepsia  dextrose Oral Gel 15 Gram(s) Oral once PRN Blood Glucose LESS THAN 70 milliGRAM(s)/deciliter  melatonin 3 milliGRAM(s) Oral at bedtime PRN Insomnia  ondansetron Injectable 4 milliGRAM(s) IV Push every 8 hours PRN Nausea and/or Vomiting      Allergies  No Known Allergies      Review of Systems:   Constitutional:  No Fever, No Chills  ENT/Mouth:  No Hearing Changes,  No Difficulty Swallowing  Eyes:  No Eye Pain, No Vision Changes  Cardiovascular:  No Chest Pain, No Palpitations  Respiratory:  No Cough, No Dyspnea  Gastrointestinal:  As described in HPI  Musculoskeletal:  No Joint Swelling, No Back Pain  Skin:  No Skin Lesions, No Jaundice  Neuro:  No Syncope, No Dizziness  Heme/Lymph:  No Bruising, No Bleeding.      Vital Signs Last 24 Hrs  T(C): 36.6 (09 May 2023 05:07), Max: 36.8 (08 May 2023 09:04)  T(F): 97.8 (09 May 2023 05:07), Max: 98.3 (08 May 2023 11:59)  HR: 72 (09 May 2023 05:07) (63 - 79)  BP: 95/645 (09 May 2023 05:07) (95/645 - 123/62)  BP(mean): 80 (08 May 2023 15:45) (80 - 80)  RR: 18 (09 May 2023 05:07) (16 - 21)  SpO2: 100% (09 May 2023 05:07) (96% - 100%)    Parameters below as of 08 May 2023 14:17  Patient On (Oxygen Delivery Method): room air      Constitutional: No acute distress.  Eyes:. Conjunctivae are clear, Sclera is non-icteric.  Ears Nose and Throat: The external ears are normal appearing,  Oral mucosa is pink and moist.  Respiratory:  No signs of respiratory distress. Lung sounds are clear bilaterally.  Cardiovascular:  S1 S2, Regular rate and rhythm.  GI: Abdomen is soft, symmetric, and non-tender without distention.  Bowel sounds are present and normoactive in all four quadrants.   Neuro: No Tremor, No involuntary movements  Skin: No rashes, No Jaundice.    Labs:                        13.0   4.56  )-----------( 259      ( 08 May 2023 07:36 )             39.0     05-08    140  |  107  |  11  ----------------------------<  102<H>  3.7   |  22  |  0.6<L>    Ca    8.9      08 May 2023 07:36  Mg     1.8     05-08    TPro  6.5  /  Alb  3.2<L>  /  TBili  1.2  /  DBili  x   /  AST  406<H>  /  ALT  455<H>  /  AlkPhos  142<H>  05-08

## 2023-05-09 NOTE — DISCHARGE NOTE PROVIDER - PROVIDER TOKENS
PROVIDER:[TOKEN:[[id number]],FOLLOWUP:[2 weeks]],PROVIDER:[TOKEN:[[id number]],FOLLOWUP:[2 weeks]],PROVIDER:[TOKEN:[[id number]],FOLLOWUP:[2 weeks]]

## 2023-05-09 NOTE — PROGRESS NOTE ADULT - SUBJECTIVE AND OBJECTIVE BOX
TERESA SACKS 52y Female  MRN#: [id number]     Hospital Day: 3d    CC:  Gallstones w/ CBD dilation 1cm    HOSPITAL COURSE:   HPI:  52F. PMH: HTN, DM (on Tresiba), GERD  Transferred from  5/6. Initially presented c/o intermittent upper mid-abdominal pain and intermittent episodes of vomiting x1wk.  Pt denies chest pain, fever, SOB, recent foreign travels, lightheadedness, dysuria, hematuria, bloody stools  -admitted with LFT 1/247/428/555 LFT today 1.2/168/368/491, INR normal   CTAP: Gallstones and wall thickening with trace pericholecystic fluid seen on US, CBD dilation 1cm  Surgery and GI on board- S/p ERCP 5/8 - bx obtained. Results noted below. Planned for lap radha this admission.     SUBJECTIVE     Overnight events  None    Subjective complaints                                               ----------------------------------------------------------  OBJECTIVE  PAST MEDICAL & SURGICAL HISTORY  Diabetes    Hypertension    GERD (gastroesophageal reflux disease)    No significant past surgical history                                              -----------------------------------------------------------  ALLERGIES:  No Known Allergies                                            ------------------------------------------------------------    HOME MEDICATIONS  Home Medications:  AMLOD/ATORVA 10-40MG TAB:  (06 May 2023 17:30)  HYDROCHLOROT 25MG TAB:  (06 May 2023 17:30)  JANUMET 50-1,000 MG TABLET: TAKE 1 TABLET BY MOUTH EVERY DAY (06 May 2023 17:30)  JARDIANCE 25 MG TABLET: TAKE 1 TABLET BY MOUTH EVERY DAY (06 May 2023 17:30)  LOSARTAN 100MG TAB:  (06 May 2023 17:30)  PANTOPRAZOLE 40MG DR TAB:  (06 May 2023 17:30)  RANOLAZINE 500MG ER TAB:  (06 May 2023 17:30)  TRESIBA FLEX 100U/ML PEN:  (06 May 2023 18:02)  TRULICITY 1.5 MG/0.5 ML PEN: INJECT 0.5 ML SUBCUTANEOUSLY ONE TIME PER WEEK (06 May 2023 17:30)                           MEDICATIONS:  STANDING MEDICATIONS  amLODIPine   Tablet 10 milliGRAM(s) Oral at bedtime  atorvastatin 40 milliGRAM(s) Oral at bedtime  cefepime   IVPB 2000 milliGRAM(s) IV Intermittent every 12 hours  chlorhexidine 2% Cloths 1 Application(s) Topical <User Schedule>  dextrose 5%. 1000 milliLiter(s) IV Continuous <Continuous>  dextrose 50% Injectable 25 Gram(s) IV Push once  glucagon  Injectable 1 milliGRAM(s) IntraMuscular once  hydrochlorothiazide 25 milliGRAM(s) Oral once  insulin lispro (ADMELOG) corrective regimen sliding scale   SubCutaneous three times a day before meals  lactated ringers. 1000 milliLiter(s) IV Continuous <Continuous>  losartan 100 milliGRAM(s) Oral daily  metroNIDAZOLE  IVPB 500 milliGRAM(s) IV Intermittent every 8 hours  pantoprazole    Tablet 40 milliGRAM(s) Oral before breakfast  potassium chloride    Tablet ER 20 milliEquivalent(s) Oral once  ranolazine 500 milliGRAM(s) Oral two times a day    PRN MEDICATIONS  acetaminophen     Tablet .. 650 milliGRAM(s) Oral every 6 hours PRN  aluminum hydroxide/magnesium hydroxide/simethicone Suspension 30 milliLiter(s) Oral every 4 hours PRN  dextrose Oral Gel 15 Gram(s) Oral once PRN  melatonin 3 milliGRAM(s) Oral at bedtime PRN  ondansetron Injectable 4 milliGRAM(s) IV Push every 8 hours PRN                                            ------------------------------------------------------------  VITAL SIGNS: Last 24 Hours  T(C): 36.6 (09 May 2023 05:07), Max: 36.8 (08 May 2023 09:04)  T(F): 97.8 (09 May 2023 05:07), Max: 98.3 (08 May 2023 11:59)  HR: 72 (09 May 2023 05:07) (63 - 79)  BP: 95/645 (09 May 2023 05:07) (95/645 - 123/62)  BP(mean): 80 (08 May 2023 15:45) (80 - 80)  RR: 18 (09 May 2023 05:07) (16 - 21)  SpO2: 100% (09 May 2023 05:07) (96% - 100%)                                             --------------------------------------------------------------  LABS:                        13.0   4.56  )-----------( 259      ( 08 May 2023 07:36 )             39.0     05-08    140  |  107  |  11  ----------------------------<  102<H>  3.7   |  22  |  0.6<L>    Ca    8.9      08 May 2023 07:36  Mg     1.8     05-08    TPro  6.5  /  Alb  3.2<L>  /  TBili  1.2  /  DBili  x   /  AST  406<H>  /  ALT  455<H>  /  AlkPhos  142<H>  05-08    PT/INR - ( 08 May 2023 07:36 )   PT: 14.00 sec;   INR: 1.22 ratio         PTT - ( 08 May 2023 07:36 )  PTT:34.0 sec            Culture - Blood (collected 06 May 2023 16:56)  Source: .Blood Blood  Preliminary Report (07 May 2023 23:02):    No growth to date.                                                    -------------------------------------------------------------  RADIOLOGY:                                            --------------------------------------------------------------    PHYSICAL EXAM:                                             --------------------------------------------------------------                 TERESA SACKS 52y Female  MRN#: [id number]     Hospital Day: 3d    CC:  Gallstones w/ CBD dilation 1cm    HOSPITAL COURSE:   HPI:  52F. PMH: HTN, DM (on Tresiba), GERD  Transferred from  5/6. Initially presented c/o intermittent upper mid-abdominal pain and intermittent episodes of vomiting x1wk.  Pt denies chest pain, fever, SOB, recent foreign travels, lightheadedness, dysuria, hematuria, bloody stools  -admitted with LFT 1/247/428/555 LFT today 1.2/168/368/491, INR normal   CTAP: Gallstones and wall thickening with trace pericholecystic fluid seen on US, CBD dilation 1cm  Surgery and GI on board- S/p EUS 5/8 - bx obtained, no sludge in GB or CBD so no ERCP done. Results noted below. Planned for lap radha this admission.     SUBJECTIVE     Overnight events  None    Subjective complaints   Pt evaluated at bedside. No active complaints. Tolerated food last night                                            ----------------------------------------------------------  OBJECTIVE  PAST MEDICAL & SURGICAL HISTORY  Diabetes    Hypertension    GERD (gastroesophageal reflux disease)    No significant past surgical history                                              -----------------------------------------------------------  ALLERGIES:  No Known Allergies                                            ------------------------------------------------------------    HOME MEDICATIONS  Home Medications:  AMLOD/ATORVA 10-40MG TAB:  (06 May 2023 17:30)  HYDROCHLOROT 25MG TAB:  (06 May 2023 17:30)  JANUMET 50-1,000 MG TABLET: TAKE 1 TABLET BY MOUTH EVERY DAY (06 May 2023 17:30)  JARDIANCE 25 MG TABLET: TAKE 1 TABLET BY MOUTH EVERY DAY (06 May 2023 17:30)  LOSARTAN 100MG TAB:  (06 May 2023 17:30)  PANTOPRAZOLE 40MG DR TAB:  (06 May 2023 17:30)  RANOLAZINE 500MG ER TAB:  (06 May 2023 17:30)  TRESIBA FLEX 100U/ML PEN:  (06 May 2023 18:02)  TRULICITY 1.5 MG/0.5 ML PEN: INJECT 0.5 ML SUBCUTANEOUSLY ONE TIME PER WEEK (06 May 2023 17:30)                           MEDICATIONS:  STANDING MEDICATIONS  amLODIPine   Tablet 10 milliGRAM(s) Oral at bedtime  atorvastatin 40 milliGRAM(s) Oral at bedtime  cefepime   IVPB 2000 milliGRAM(s) IV Intermittent every 12 hours  chlorhexidine 2% Cloths 1 Application(s) Topical <User Schedule>  dextrose 5%. 1000 milliLiter(s) IV Continuous <Continuous>  dextrose 50% Injectable 25 Gram(s) IV Push once  glucagon  Injectable 1 milliGRAM(s) IntraMuscular once  hydrochlorothiazide 25 milliGRAM(s) Oral once  insulin lispro (ADMELOG) corrective regimen sliding scale   SubCutaneous three times a day before meals  lactated ringers. 1000 milliLiter(s) IV Continuous <Continuous>  losartan 100 milliGRAM(s) Oral daily  metroNIDAZOLE  IVPB 500 milliGRAM(s) IV Intermittent every 8 hours  pantoprazole    Tablet 40 milliGRAM(s) Oral before breakfast  potassium chloride    Tablet ER 20 milliEquivalent(s) Oral once  ranolazine 500 milliGRAM(s) Oral two times a day    PRN MEDICATIONS  acetaminophen     Tablet .. 650 milliGRAM(s) Oral every 6 hours PRN  aluminum hydroxide/magnesium hydroxide/simethicone Suspension 30 milliLiter(s) Oral every 4 hours PRN  dextrose Oral Gel 15 Gram(s) Oral once PRN  melatonin 3 milliGRAM(s) Oral at bedtime PRN  ondansetron Injectable 4 milliGRAM(s) IV Push every 8 hours PRN                                            ------------------------------------------------------------  VITAL SIGNS: Last 24 Hours  T(C): 36.6 (09 May 2023 05:07), Max: 36.8 (08 May 2023 09:04)  T(F): 97.8 (09 May 2023 05:07), Max: 98.3 (08 May 2023 11:59)  HR: 72 (09 May 2023 05:07) (63 - 79)  BP: 95/645 (09 May 2023 05:07) (95/645 - 123/62)  BP(mean): 80 (08 May 2023 15:45) (80 - 80)  RR: 18 (09 May 2023 05:07) (16 - 21)  SpO2: 100% (09 May 2023 05:07) (96% - 100%)                                             --------------------------------------------------------------  LABS:                        13.0   4.56  )-----------( 259      ( 08 May 2023 07:36 )             39.0     05-08    140  |  107  |  11  ----------------------------<  102<H>  3.7   |  22  |  0.6<L>    Ca    8.9      08 May 2023 07:36  Mg     1.8     05-08    TPro  6.5  /  Alb  3.2<L>  /  TBili  1.2  /  DBili  x   /  AST  406<H>  /  ALT  455<H>  /  AlkPhos  142<H>  05-08    PT/INR - ( 08 May 2023 07:36 )   PT: 14.00 sec;   INR: 1.22 ratio         PTT - ( 08 May 2023 07:36 )  PTT:34.0 sec            Culture - Blood (collected 06 May 2023 16:56)  Source: .Blood Blood  Preliminary Report (07 May 2023 23:02):    No growth to date.                                                    -------------------------------------------------------------  RADIOLOGY:                                            --------------------------------------------------------------    PHYSICAL EXAM:  GEN: NAD  NEURO: Alert & Orientedx4, no gross focal deficit  HEENT: nontraumatic, normocephalic, neck supple  CARD: S1, S2 audible, no S3, regular rate and rhythm, no murmur  PULM: B/L breath sounds, no wheezing, crackles, or rales  ABD: Soft, nondistended, nontender, Coronel sign (-)  EXTR: No clubbing, cyanosis, edema.                                            --------------------------------------------------------------

## 2023-05-09 NOTE — PROGRESS NOTE ADULT - ASSESSMENT
52F. PMH: HTN, DM (on Tresiba), GERD  Transferred from  5/6. Initially presented c/o intermittent upper mid-abdominal pain and intermittent episodes of vomiting x1wk.  Pt denies chest pain, fever, SOB, recent foreign travels, lightheadedness, dysuria, hematuria, bloody stools  CTAP: Gallstones and wall thickening with trace pericholecystic fluid seen on US, CBD dilation 1cm    #Elevated liver enzymes mixed predominantly hepatocellular r/o choledocholithiasis  #Hematemesis small episodes-resolved now DD: r/o PUD vs erosive esophagitis  #h/o GERD on PPI   *No h/o NSAID intake; JACQUELIN brown stools  -admitted with LFT 1/247/428/555 LFT today 1.2/168/368/491, INR normal   -US abdomen showed CBD 6mm, +GS 7mm, Gallstones and wall thickening with trace pericholecystic fluid.  -CT abdomen showed Mild intra and extrahepatic biliary ductal dilatation, with CBD measuring up to 1 cm and demonstrates an abrupt change of caliber at its distal end. Nonspecific gallbladder wall edema. Cholelithiasis. Right hepatic hypodensity too small to characterize  - EUS (5/8): focused examination of the common bile duct: amber-hepatis lymph node measuring 38 x 36 mm, hypoechoic, heterogenous, well defined borders, polygonal.  FNA/FNB was performed..  *****  - GI and surgery team following - post ERCP f/u surgery for additional plan/recs  - C/w cefepime (5/6-present) and flagyl (5/6-present)  - C/w tytlenol PRN for pain control  - Advanced diet 5/8  - Planned for lap radha 5/10    #HTN  -c/w home meds  hold HCTZ    #DM  - Home meds: takes trulicity, jardiance, janumet and tresiba  - hold PO meds   - ISS - monitor blood sugar    #GERD  - c/w pantoprazole    #Misc  - Diet: NPO  - GI: PPI  - DVT: N/a  - Dispo: Medicine   52F. PMH: HTN, DM (on Tresiba), GERD  Transferred from  5/6. Initially presented c/o intermittent upper mid-abdominal pain and intermittent episodes of vomiting x1wk.  Pt denies chest pain, fever, SOB, recent foreign travels, lightheadedness, dysuria, hematuria, bloody stools  CTAP: Gallstones and wall thickening with trace pericholecystic fluid seen on US, CBD dilation 1cm    #Elevated liver enzymes mixed predominantly hepatocellular r/o choledocholithiasis  #Hematemesis small episodes-resolved now DD: r/o PUD vs erosive esophagitis  #h/o GERD on PPI   *No h/o NSAID intake; JACQUELIN brown stools  -admitted with LFT 1/247/428/555 LFT today 1.2/168/368/491, INR normal   -US abdomen showed CBD 6mm, +GS 7mm, Gallstones and wall thickening with trace pericholecystic fluid.  -CT abdomen showed Mild intra and extrahepatic biliary ductal dilatation, with CBD measuring up to 1 cm and demonstrates an abrupt change of caliber at its distal end. Nonspecific gallbladder wall edema. Cholelithiasis. Right hepatic hypodensity too small to characterize  - EUS (5/8): focused examination of the common bile duct: amber-hepatis lymph node measuring 38 x 36 mm, hypoechoic, heterogenous, well defined borders, polygonal.  FNA/FNB was performed. No findings in GB or CBD to warrant ERCP so no ERCP performed per discussion with GI team.   *****  - GI and surgery team following - post ERCP f/u surgery for additional plan/recs  - C/w cefepime (5/6-present) and flagyl (5/6-present) - will DC w/ 5 days of cipro/flagyl   - C/w pain control  - Advanced diet 5/8  - Planned for lap radha 5/10    #HTN  -c/w home meds  hold HCTZ    #DM  - Home meds: takes trulicity, jardiance, janumet and tresiba  - hold PO meds   - ISS - monitor blood sugar    #GERD  - c/w pantoprazole    #Misc  - Diet: Low fat  - GI: PPI  - DVT: N/a  - Dispo: Medicine

## 2023-05-09 NOTE — PROGRESS NOTE ADULT - SUBJECTIVE AND OBJECTIVE BOX
GENERAL SURGERY PROGRESS NOTE    Patient: SACKS, TERESA , 52y ([birth year])Female   MRN: [id number]  Location: [place] [address] A  Visit: 05-06-23 Inpatient  Date: 05-09-23 @ 00:33    Hospital Day #: 4    Events of past 24 hours: s/p EUS with LADY. No stones in CBD    PAST MEDICAL & SURGICAL HISTORY:  Diabetes      Hypertension      GERD (gastroesophageal reflux disease)      No significant past surgical history          Vitals:   T(F): 98 (05-08-23 @ 15:45), Max: 98.3 (05-08-23 @ 04:38)  HR: 66 (05-08-23 @ 15:45)  BP: 115/57 (05-08-23 @ 15:45)  RR: 18 (05-08-23 @ 15:45)  SpO2: 96% (05-08-23 @ 15:45)      Diet, Clear Liquid:   Consistent Carbohydrate No Snacks  No Red Dye      Fluids:     I & O's:    Bowel Movement: : [] YES [] NO  Flatus: : [] YES [] NO    PHYSICAL EXAM:  General: NAD, AAOx3, calm and cooperative  HEENT: NCAT  Cardiac: No peripheral cyanosis or pallor, extremities well perfused   Respiratory: Non-labored breathing, equal chest rise bilaterally   Abdomen: Soft, non-distended, non-tender,   Musculoskeletal: Strength 5/5 BL UE/LE, ROM intact, compartments soft  Neuro: Sensation grossly intact and equal throughout, no focal deficits  Vascular: Pulses 2+ throughout, extremities well perfused  Skin: Warm/dry, normal color, no jaundice    MEDICATIONS  (STANDING):  amLODIPine   Tablet 10 milliGRAM(s) Oral at bedtime  atorvastatin 40 milliGRAM(s) Oral at bedtime  cefepime   IVPB 2000 milliGRAM(s) IV Intermittent every 12 hours  chlorhexidine 2% Cloths 1 Application(s) Topical <User Schedule>  dextrose 5%. 1000 milliLiter(s) (50 mL/Hr) IV Continuous <Continuous>  dextrose 50% Injectable 25 Gram(s) IV Push once  glucagon  Injectable 1 milliGRAM(s) IntraMuscular once  hydrochlorothiazide 25 milliGRAM(s) Oral once  insulin lispro (ADMELOG) corrective regimen sliding scale   SubCutaneous three times a day before meals  lactated ringers. 1000 milliLiter(s) (115 mL/Hr) IV Continuous <Continuous>  losartan 100 milliGRAM(s) Oral daily  metroNIDAZOLE  IVPB 500 milliGRAM(s) IV Intermittent every 8 hours  pantoprazole    Tablet 40 milliGRAM(s) Oral before breakfast  potassium chloride    Tablet ER 20 milliEquivalent(s) Oral once  ranolazine 500 milliGRAM(s) Oral two times a day    MEDICATIONS  (PRN):  acetaminophen     Tablet .. 650 milliGRAM(s) Oral every 6 hours PRN Temp greater or equal to 38C (100.4F), Mild Pain (1 - 3)  aluminum hydroxide/magnesium hydroxide/simethicone Suspension 30 milliLiter(s) Oral every 4 hours PRN Dyspepsia  dextrose Oral Gel 15 Gram(s) Oral once PRN Blood Glucose LESS THAN 70 milliGRAM(s)/deciliter  melatonin 3 milliGRAM(s) Oral at bedtime PRN Insomnia  ondansetron Injectable 4 milliGRAM(s) IV Push every 8 hours PRN Nausea and/or Vomiting      DVT PROPHYLAXIS:   GI PROPHYLAXIS: pantoprazole    Tablet 40 milliGRAM(s) Oral before breakfast    ANTICOAGULATION:   ANTIBIOTICS:  cefepime   IVPB 2000 milliGRAM(s)  metroNIDAZOLE  IVPB 500 milliGRAM(s)            LAB/STUDIES:  Labs:  CAPILLARY BLOOD GLUCOSE      POCT Blood Glucose.: 82 mg/dL (08 May 2023 11:40)  POCT Blood Glucose.: 94 mg/dL (08 May 2023 07:38)                          13.0   4.56  )-----------( 259      ( 08 May 2023 07:36 )             39.0       Auto Neutrophil %: 55.5 % (05-08-23 @ 07:36)  Auto Immature Granulocyte %: 0.2 % (05-08-23 @ 07:36)    05-08    140  |  107  |  11  ----------------------------<  102<H>  3.7   |  22  |  0.6<L>      Calcium, Total Serum: 8.9 mg/dL (05-08-23 @ 07:36)      LFTs:             6.5  | 1.2  | 406      ------------------[142     ( 08 May 2023 07:36 )  3.2  | x    | 455         Lipase:x      Amylase:x             Coags:     14.00  ----< 1.22    ( 08 May 2023 07:36 )     34.0                    Culture - Blood (collected 06 May 2023 16:56)  Source: .Blood Blood  Preliminary Report (07 May 2023 23:02):    No growth to date.              IMAGING:

## 2023-05-10 PROBLEM — K21.9 GASTRO-ESOPHAGEAL REFLUX DISEASE WITHOUT ESOPHAGITIS: Chronic | Status: ACTIVE | Noted: 2023-05-06

## 2023-05-10 PROBLEM — E11.9 TYPE 2 DIABETES MELLITUS WITHOUT COMPLICATIONS: Chronic | Status: ACTIVE | Noted: 2023-05-06

## 2023-05-10 PROBLEM — Z00.00 ENCOUNTER FOR PREVENTIVE HEALTH EXAMINATION: Status: ACTIVE | Noted: 2023-05-10

## 2023-05-10 PROBLEM — I10 ESSENTIAL (PRIMARY) HYPERTENSION: Chronic | Status: ACTIVE | Noted: 2023-05-06

## 2023-05-11 LAB
CULTURE RESULTS: SIGNIFICANT CHANGE UP
SPECIMEN SOURCE: SIGNIFICANT CHANGE UP

## 2023-05-14 LAB — TM INTERPRETATION: SIGNIFICANT CHANGE UP

## 2023-05-15 ENCOUNTER — APPOINTMENT (OUTPATIENT)
Dept: GASTROENTEROLOGY | Facility: CLINIC | Age: 53
End: 2023-05-15

## 2023-05-15 LAB — NON-GYNECOLOGICAL CYTOLOGY STUDY: SIGNIFICANT CHANGE UP

## 2023-05-16 DIAGNOSIS — R74.01 ELEVATION OF LEVELS OF LIVER TRANSAMINASE LEVELS: ICD-10-CM

## 2023-05-16 DIAGNOSIS — K80.00 CALCULUS OF GALLBLADDER WITH ACUTE CHOLECYSTITIS WITHOUT OBSTRUCTION: ICD-10-CM

## 2023-05-16 DIAGNOSIS — E11.9 TYPE 2 DIABETES MELLITUS WITHOUT COMPLICATIONS: ICD-10-CM

## 2023-05-16 DIAGNOSIS — I10 ESSENTIAL (PRIMARY) HYPERTENSION: ICD-10-CM

## 2023-05-16 DIAGNOSIS — Z79.85 LONG-TERM (CURRENT) USE OF INJECTABLE NON-INSULIN ANTIDIABETIC DRUGS: ICD-10-CM

## 2023-05-16 DIAGNOSIS — K29.71 GASTRITIS, UNSPECIFIED, WITH BLEEDING: ICD-10-CM

## 2023-05-16 DIAGNOSIS — R10.9 UNSPECIFIED ABDOMINAL PAIN: ICD-10-CM

## 2023-05-16 DIAGNOSIS — K21.9 GASTRO-ESOPHAGEAL REFLUX DISEASE WITHOUT ESOPHAGITIS: ICD-10-CM

## 2023-05-16 DIAGNOSIS — Z79.84 LONG TERM (CURRENT) USE OF ORAL HYPOGLYCEMIC DRUGS: ICD-10-CM

## 2023-05-16 DIAGNOSIS — R59.0 LOCALIZED ENLARGED LYMPH NODES: ICD-10-CM

## 2023-06-19 ENCOUNTER — TRANSCRIPTION ENCOUNTER (OUTPATIENT)
Age: 53
End: 2023-06-19

## 2023-06-19 ENCOUNTER — OUTPATIENT (OUTPATIENT)
Dept: OUTPATIENT SERVICES | Facility: HOSPITAL | Age: 53
LOS: 1 days | End: 2023-06-19
Payer: COMMERCIAL

## 2023-06-19 VITALS
HEART RATE: 74 BPM | TEMPERATURE: 97 F | RESPIRATION RATE: 16 BRPM | WEIGHT: 188.5 LBS | OXYGEN SATURATION: 98 % | SYSTOLIC BLOOD PRESSURE: 109 MMHG | DIASTOLIC BLOOD PRESSURE: 66 MMHG | HEIGHT: 67 IN

## 2023-06-19 DIAGNOSIS — K80.20 CALCULUS OF GALLBLADDER WITHOUT CHOLECYSTITIS WITHOUT OBSTRUCTION: ICD-10-CM

## 2023-06-19 DIAGNOSIS — Z01.818 ENCOUNTER FOR OTHER PREPROCEDURAL EXAMINATION: ICD-10-CM

## 2023-06-19 DIAGNOSIS — Z98.890 OTHER SPECIFIED POSTPROCEDURAL STATES: Chronic | ICD-10-CM

## 2023-06-19 LAB
A1C WITH ESTIMATED AVERAGE GLUCOSE RESULT: 7.9 % — HIGH (ref 4–5.6)
ALBUMIN SERPL ELPH-MCNC: 4.1 G/DL — SIGNIFICANT CHANGE UP (ref 3.5–5.2)
ALP SERPL-CCNC: 171 U/L — HIGH (ref 30–115)
ALT FLD-CCNC: 57 U/L — HIGH (ref 0–41)
ANION GAP SERPL CALC-SCNC: 13 MMOL/L — SIGNIFICANT CHANGE UP (ref 7–14)
APTT BLD: 30.9 SEC — SIGNIFICANT CHANGE UP (ref 27–39.2)
AST SERPL-CCNC: 50 U/L — HIGH (ref 0–41)
BASOPHILS # BLD AUTO: 0.05 K/UL — SIGNIFICANT CHANGE UP (ref 0–0.2)
BASOPHILS NFR BLD AUTO: 0.9 % — SIGNIFICANT CHANGE UP (ref 0–1)
BILIRUB SERPL-MCNC: 0.7 MG/DL — SIGNIFICANT CHANGE UP (ref 0.2–1.2)
BUN SERPL-MCNC: 16 MG/DL — SIGNIFICANT CHANGE UP (ref 10–20)
CALCIUM SERPL-MCNC: 9.8 MG/DL — SIGNIFICANT CHANGE UP (ref 8.4–10.5)
CHLORIDE SERPL-SCNC: 105 MMOL/L — SIGNIFICANT CHANGE UP (ref 98–110)
CO2 SERPL-SCNC: 22 MMOL/L — SIGNIFICANT CHANGE UP (ref 17–32)
CREAT SERPL-MCNC: 0.7 MG/DL — SIGNIFICANT CHANGE UP (ref 0.7–1.5)
EGFR: 104 ML/MIN/1.73M2 — SIGNIFICANT CHANGE UP
EOSINOPHIL # BLD AUTO: 0.09 K/UL — SIGNIFICANT CHANGE UP (ref 0–0.7)
EOSINOPHIL NFR BLD AUTO: 1.6 % — SIGNIFICANT CHANGE UP (ref 0–8)
ESTIMATED AVERAGE GLUCOSE: 180 MG/DL — HIGH (ref 68–114)
GLUCOSE SERPL-MCNC: 242 MG/DL — HIGH (ref 70–99)
HCT VFR BLD CALC: 43.1 % — SIGNIFICANT CHANGE UP (ref 37–47)
HGB BLD-MCNC: 14.5 G/DL — SIGNIFICANT CHANGE UP (ref 12–16)
IMM GRANULOCYTES NFR BLD AUTO: 0.4 % — HIGH (ref 0.1–0.3)
INR BLD: 0.99 RATIO — SIGNIFICANT CHANGE UP (ref 0.65–1.3)
LYMPHOCYTES # BLD AUTO: 1.98 K/UL — SIGNIFICANT CHANGE UP (ref 1.2–3.4)
LYMPHOCYTES # BLD AUTO: 35.7 % — SIGNIFICANT CHANGE UP (ref 20.5–51.1)
MCHC RBC-ENTMCNC: 31.8 PG — HIGH (ref 27–31)
MCHC RBC-ENTMCNC: 33.6 G/DL — SIGNIFICANT CHANGE UP (ref 32–37)
MCV RBC AUTO: 94.5 FL — SIGNIFICANT CHANGE UP (ref 81–99)
MONOCYTES # BLD AUTO: 0.41 K/UL — SIGNIFICANT CHANGE UP (ref 0.1–0.6)
MONOCYTES NFR BLD AUTO: 7.4 % — SIGNIFICANT CHANGE UP (ref 1.7–9.3)
NEUTROPHILS # BLD AUTO: 2.99 K/UL — SIGNIFICANT CHANGE UP (ref 1.4–6.5)
NEUTROPHILS NFR BLD AUTO: 54 % — SIGNIFICANT CHANGE UP (ref 42.2–75.2)
NRBC # BLD: 0 /100 WBCS — SIGNIFICANT CHANGE UP (ref 0–0)
PLATELET # BLD AUTO: 275 K/UL — SIGNIFICANT CHANGE UP (ref 130–400)
PMV BLD: 11.4 FL — HIGH (ref 7.4–10.4)
POTASSIUM SERPL-MCNC: 3.9 MMOL/L — SIGNIFICANT CHANGE UP (ref 3.5–5)
POTASSIUM SERPL-SCNC: 3.9 MMOL/L — SIGNIFICANT CHANGE UP (ref 3.5–5)
PROT SERPL-MCNC: 7.4 G/DL — SIGNIFICANT CHANGE UP (ref 6–8)
PROTHROM AB SERPL-ACNC: 11.3 SEC — SIGNIFICANT CHANGE UP (ref 9.95–12.87)
RBC # BLD: 4.56 M/UL — SIGNIFICANT CHANGE UP (ref 4.2–5.4)
RBC # FLD: 14.1 % — SIGNIFICANT CHANGE UP (ref 11.5–14.5)
SODIUM SERPL-SCNC: 140 MMOL/L — SIGNIFICANT CHANGE UP (ref 135–146)
WBC # BLD: 5.54 K/UL — SIGNIFICANT CHANGE UP (ref 4.8–10.8)
WBC # FLD AUTO: 5.54 K/UL — SIGNIFICANT CHANGE UP (ref 4.8–10.8)

## 2023-06-19 PROCEDURE — 85610 PROTHROMBIN TIME: CPT

## 2023-06-19 PROCEDURE — 99214 OFFICE O/P EST MOD 30 MIN: CPT | Mod: 25

## 2023-06-19 PROCEDURE — 85025 COMPLETE CBC W/AUTO DIFF WBC: CPT

## 2023-06-19 PROCEDURE — 36415 COLL VENOUS BLD VENIPUNCTURE: CPT

## 2023-06-19 PROCEDURE — 85730 THROMBOPLASTIN TIME PARTIAL: CPT

## 2023-06-19 PROCEDURE — 93010 ELECTROCARDIOGRAM REPORT: CPT

## 2023-06-19 PROCEDURE — 83036 HEMOGLOBIN GLYCOSYLATED A1C: CPT

## 2023-06-19 PROCEDURE — 80053 COMPREHEN METABOLIC PANEL: CPT

## 2023-06-19 PROCEDURE — 93005 ELECTROCARDIOGRAM TRACING: CPT

## 2023-06-19 RX ORDER — DULAGLUTIDE 4.5 MG/.5ML
0 INJECTION, SOLUTION SUBCUTANEOUS
Qty: 0 | Refills: 0 | DISCHARGE

## 2023-06-19 NOTE — H&P PST ADULT - NSICDXPASTMEDICALHX_GEN_ALL_CORE_FT
PAST MEDICAL HISTORY:  Diabetes     Elevated LFTs     GERD (gastroesophageal reflux disease)     History of gallstones     Hypertension

## 2023-06-19 NOTE — H&P PST ADULT - NS PRO FEM REPRO HEALTH SCREEN
Routine Office Visit    Patient Name: Bernice Gonzalez    : 1941  MRN: [id number]    Subjective:  Bernice is a 78 y.o. female who presents today for:   Chief Complaint   Patient presents with    Anxiety     78-year-old female comes in for follow-up on anxiety and depression since adjusting her medications.  She had previously stopped her Effexor and Lexapro when she was only supposed to stop the Lexapro.  She has now resumed taking her Effexor.  Since doing so, she reports less episodes of anxiety, and feeling labs down.  She is also sleeping better.  The patient was previously supposed to do her lab test for follow-up on anemia did not do so.  She agrees to get it done now.  She denies blood in her stools.  She denies blood in her urine.    Past Medical History  Past Medical History:   Diagnosis Date    Anxiety state, unspecified     Depressive disorder, not elsewhere classified     Essential hypertension, benign     Generalized osteoarthrosis, involving multiple sites     Iron deficiency anemia secondary to inadequate dietary iron intake     Obesity, unspecified     Osteopenia     Other and unspecified hyperlipidemia     Persistent disorder of initiating or maintaining sleep     SOB (shortness of breath)     Syncope     Type II or unspecified type diabetes mellitus without mention of complication, uncontrolled     Unspecified hypothyroidism        Past Surgical History  Past Surgical History:   Procedure Laterality Date    APPENDECTOMY      TUBAL LIGATION      VEIN SURGERY          Family History  Family History   Problem Relation Age of Onset    Heart disease Father     Breast cancer Neg Hx     Cancer Neg Hx     Ovarian cancer Neg Hx        Social History  Social History     Socioeconomic History    Marital status:      Spouse name: Not on file    Number of children: Not on file    Years of education: Not on file    Highest education level: Not on file   Occupational History     Not on file   Social Needs    Financial resource strain: Not on file    Food insecurity:     Worry: Not on file     Inability: Not on file    Transportation needs:     Medical: Not on file     Non-medical: Not on file   Tobacco Use    Smoking status: Never Smoker    Smokeless tobacco: Never Used   Substance and Sexual Activity    Alcohol use: No    Drug use: No    Sexual activity: Yes     Partners: Male     Birth control/protection: Post-menopausal   Lifestyle    Physical activity:     Days per week: Not on file     Minutes per session: Not on file    Stress: Not on file   Relationships    Social connections:     Talks on phone: Not on file     Gets together: Not on file     Attends [employer] service: Not on file     Active member of club or organization: Not on file     Attends meetings of clubs or organizations: Not on file     Relationship status: Not on file   Other Topics Concern    Not on file   Social History Narrative    Not on file       Current Medications  Current Outpatient Medications on File Prior to Visit   Medication Sig Dispense Refill    ammonium lactate (LAC-HYDRIN) 12 % lotion Apply topically 2 (two) times daily. 1 Bottle 3    aspirin (ECOTRIN) 81 MG EC tablet Take 81 mg by mouth once daily.      atorvastatin (LIPITOR) 40 MG tablet TAKE 1 TABLET(40 MG) BY MOUTH EVERY DAY 90 tablet 0    blood sugar diagnostic Strp 1 strip by Misc.(Non-Drug; Combo Route) route 2 times daily 2 hours after meal. 200 strip 5    cyanocobalamin (VITAMIN B-12) 1000 MCG tablet Take 100 mcg by mouth once daily.      diclofenac sodium 100 mg 24 hr tablet TK ONE  T PO AFTER BREAKFAST FOR PAIN RELIEF  1    dicyclomine (BENTYL) 20 mg tablet Take 1 tablet (20 mg total) by mouth every 6 (six) hours. 360 tablet 1    FLUAD 9770-7679, 65 YR UP,,PF, 45 mcg (15 mcg x 3)/0.5 mL Syrg ADM 0.5ML IM UTD  0    glipiZIDE (GLUCOTROL) 10 MG TR24 TAKE 1 TABLET(10 MG) BY MOUTH EVERY DAY 90 tablet 0    lactobacillus  acidophilus & bulgar (LACTINEX) 100 million cell packet Take 1 tablet by mouth 2 (two) times daily.      levothyroxine (SYNTHROID) 88 MCG tablet TAKE 1 TABLET(88 MCG) BY MOUTH EVERY DAY 90 tablet 0    lisinopril-hydrochlorothiazide (PRINZIDE,ZESTORETIC) 10-12.5 mg per tablet Take 1 tablet by mouth once daily. 90 tablet 0    loratadine (CLARITIN) 10 mg tablet Take 10 mg by mouth once daily.      meloxicam (MOBIC) 15 MG tablet TK 1 T PO QD WF  0    metFORMIN (GLUCOPHAGE) 1000 MG tablet TAKE 1 TABLET(1000 MG) BY MOUTH TWICE DAILY WITH MEALS 180 tablet 1    metoprolol succinate (TOPROL-XL) 50 MG 24 hr tablet TAKE 1 TABLET(50 MG) BY MOUTH EVERY DAY 90 tablet 0    omeprazole (PRILOSEC) 20 MG capsule Take 1 capsule (20 mg total) by mouth once daily. 90 capsule 0    primidone (MYSOLINE) 50 MG Tab TK 1 TO 3 TS PO QPM  3    JEFF LO 40 40 % Crea   0    tramadol (ULTRAM) 50 mg tablet TAKE 1 TABLET BY MOUTH EVERY 6 HOURS AS NEEDED FOR PAIN. 40 tablet 0    triamcinolone acetonide 0.1% (KENALOG) 0.1 % ointment Apply topically 2 (two) times daily. 30 g 0    venlafaxine (EFFEXOR-XR) 75 MG 24 hr capsule TK ONE C PO  QAM.  3    blood-glucose meter (TRUE METRIX GLUCOSE METER) Misc 1 Device by Misc.(Non-Drug; Combo Route) route 2 (two) times daily with meals. 1 each 0    lancets (LANCETS,THIN) Misc 1 Stick by Misc.(Non-Drug; Combo Route) route once. 100 each 5    valACYclovir (VALTREX) 1000 MG tablet Take 1 tablet (1,000 mg total) by mouth 3 (three) times daily. for 7 days 21 tablet 0     No current facility-administered medications on file prior to visit.        Allergies   Review of patient's allergies indicates:  No Known Allergies    Review of Systems   Constitutional: Negative for unexpected weight change.   Respiratory: Negative for shortness of breath and wheezing.    Cardiovascular: Negative for chest pain and palpitations.   Gastrointestinal: Negative for blood in stool.   Genitourinary: Negative for dysuria and  "hematuria.   Psychiatric/Behavioral: Negative for agitation, decreased concentration, dysphoric mood, hallucinations and sleep disturbance. The patient is nervous/anxious (but improved). The patient is not hyperactive.      /66 (BP Location: Left arm, Patient Position: Sitting, BP Method: Small (Manual))   Pulse 69   Temp 97.9 °F (36.6 °C) (Oral)   Resp 17   Ht 4' 9" (1.448 m)   Wt 65.2 kg (143 lb 11.8 oz)   SpO2 97%   BMI 31.11 kg/m²     Physical Exam   Constitutional: She appears well-developed and well-nourished.   HENT:   Head: Normocephalic and atraumatic.   Right Ear: External ear normal.   Left Ear: External ear normal.   Nose: Nose normal.   Mouth/Throat: Oropharynx is clear and moist. No oropharyngeal exudate.   Eyes: Pupils are equal, round, and reactive to light. Conjunctivae and EOM are normal. Right eye exhibits no discharge. Left eye exhibits no discharge.   Neck: Normal range of motion. Neck supple. No tracheal deviation present.   Cardiovascular: Normal rate, regular rhythm, normal heart sounds and intact distal pulses.   No murmur heard.  Pulmonary/Chest: Effort normal and breath sounds normal. She has no wheezes. She has no rales.   Abdominal: Soft. Bowel sounds are normal. She exhibits no mass. There is no tenderness.   Lymphadenopathy:     She has no cervical adenopathy.   Skin:   Over right deltoid previously noted rash is much clearer.  No vesicles.   Psychiatric: Her mood appears not anxious. Her affect is not angry, not blunt and not inappropriate. Her speech is not rapid and/or pressured and not slurred. She is not aggressive, not hyperactive and not actively hallucinating. Thought content is not paranoid. She expresses no homicidal and no suicidal ideation.   Vitals reviewed.        Assessment/Plan:  eBrnice was seen today for anxiety.    Diagnoses and all orders for this visit:    Anxiety  Much improved.  A however patient still not fully controlled.  Will continue Effexor, " and follow up in 4 to 6 weeks.    Moderate episode of recurrent major depressive disorder  Depression much improved.  Continue Effexor.    Anemia, unspecified type  Patient will do labs today and will call her with results and follow-up plan.              -Roland Munguia Jr., MD, AAHIVS          This office note has been dictated.  This dictation has been generated using M-Modal Fluency Direct dictation; some phonetic errors may occur.      mammogram

## 2023-06-19 NOTE — H&P PST ADULT - NSANTHAGERD_ENT_A_CORE
Take tylenol 650mg every 6 hours for 2 weeks  Take Celebrex twice a day for 2 weeks (Can take aleve 500mg twice a day instead)  Take Aspirin 81mg twice a day for 1 month  Take Protonix 40mg daily for 4 weeks  Take Gabapentin 100mg three times a day for 4 weeks  Take Oxycodone 1-2 tabs every 4-6 hours as needed  Take Tramadol 1 tab every 6 hours as needed.  Take Colace 100mg twice a day as needed.  Wear compression stockings for 4 weeks  Weight bear as tolerated with assistive device  No strenuous activity, heavy lifting, driving or returning to work until cleared by MD.  You may shower - dressing is water-resistant, no soaking in bathtubs.  Keep dressing on until appointment with Dr. Azevedo, call for 2 week follow up.  Swelling may travel all the way down leg to foot, this is normal and will subside in a few weeks.  Contact your doctor if you experience: fever greater than 101.5, chills, chest pain, difficulty breathing, redness or excessive drainage around the incision, other concerns.  Follow up with your primary care provider. Yes

## 2023-06-19 NOTE — H&P PST ADULT - REASON FOR ADMISSION
51 y/o female presents to PAST in preparation for laparoscopic cholecystectomy in [facility] under GA with Dr. Call on 7/10/23

## 2023-06-19 NOTE — H&P PST ADULT - NSANTHOSAYNRD_GEN_A_CORE
No. WOODY screening performed.  STOP BANG Legend: 0-2 = LOW Risk; 3-4 = INTERMEDIATE Risk; 5-8 = HIGH Risk

## 2023-06-19 NOTE — H&P PST ADULT - HISTORY OF PRESENT ILLNESS
53 y/o female presents to PAST in preparation for laparoscopic cholecystectomy in [facility] under GA with Dr. Call on 7/10/23     Pt states that she had episodes of vomiting on 5/6/23 causing her to go to the ER. CT scan was completed that showed gallstone. pt denies any pain or discomfort besides the vomiting at that time. Pt now for above procedure due to findings.     PATIENT CURRENTLY DENIES CHEST PAIN  SHORTNESS OF BREATH  PALPITATIONS,  DYSURIA, OR UPPER RESPIRATORY INFECTION IN PAST 2 WEEKS  EXERCISE  TOLERANCE  4 FLIGHT OF STAIRS  WITHOUT SHORTNESS OF BREATH  pt denies any covid s/s, covid (+) 10/22  pt advised self quarantine till day of procedure  Patient verbalized understanding of instructions and was given the opportunity to ask questions and have them answered.  As per patient, this is their complete medical and surgical history, including medications both prescribed or over the counter.  written and verbal instructions with teach back on chlorhexidine shampoo provided,  pt verbalized understanding with returned demonstration    Anesthesia Alert  NO--Difficult Airway  NO--History of neck surgery or radiation  NO--Limited ROM of neck  NO--History of Malignant hyperthermia  NO--Personal or family history of Pseudocholinesterase deficiency.  NO--Prior Anesthesia Complication  NO--Latex Allergy  NO--Loose teeth  NO--History of Rheumatoid Arthritis  NO--WOODY  NO--Bleeding risk  NO--Other_____  Mallampati airway: Class II    Calculus of gallbladder without cholecystitis without obstruction    Encounter for other preprocedural examination    FH: HTN (hypertension) (Father)    No pertinent past medical history    Diabetes    Hypertension    GERD (gastroesophageal reflux disease)    No significant past surgical history    [id number]    [facility]st[facility]     51 y/o female presents to PAST in preparation for laparoscopic cholecystectomy in [facility] under GA with Dr. Call on 7/10/23     Pt states that she had episodes of vomiting on 5/6/23 causing her to go to the ER. CT scan was completed that showed gallstone. pt denies any pain or discomfort besides the vomiting at that time. Pt was found to have elevated LFTS. Pt now for above procedure due to findings.     PATIENT CURRENTLY DENIES CHEST PAIN  SHORTNESS OF BREATH  PALPITATIONS,  DYSURIA, OR UPPER RESPIRATORY INFECTION IN PAST 2 WEEKS  EXERCISE  TOLERANCE  4 FLIGHT OF STAIRS  WITHOUT SHORTNESS OF BREATH  pt denies any covid s/s, covid (+) 10/22  pt advised self quarantine till day of procedure  Patient verbalized understanding of instructions and was given the opportunity to ask questions and have them answered.  As per patient, this is their complete medical and surgical history, including medications both prescribed or over the counter.  written and verbal instructions with teach back on chlorhexidine shampoo provided,  pt verbalized understanding with returned demonstration    Anesthesia Alert  NO--Difficult Airway  NO--History of neck surgery or radiation  NO--Limited ROM of neck  NO--History of Malignant hyperthermia  NO--Personal or family history of Pseudocholinesterase deficiency.  NO--Prior Anesthesia Complication  NO--Latex Allergy  NO--Loose teeth  NO--History of Rheumatoid Arthritis  NO--WOODY  NO--Bleeding risk  NO--Other_____  Mallampati airway: Class II    Calculus of gallbladder without cholecystitis without obstruction    Encounter for other preprocedural examination    FH: HTN (hypertension) (Father)    No pertinent past medical history    Diabetes    Hypertension    GERD (gastroesophageal reflux disease)    No significant past surgical history    [id number]    [facility]_VstLnk

## 2023-06-20 DIAGNOSIS — K80.20 CALCULUS OF GALLBLADDER WITHOUT CHOLECYSTITIS WITHOUT OBSTRUCTION: ICD-10-CM

## 2023-06-20 DIAGNOSIS — Z01.818 ENCOUNTER FOR OTHER PREPROCEDURAL EXAMINATION: ICD-10-CM

## 2023-06-28 PROBLEM — Z87.19 PERSONAL HISTORY OF OTHER DISEASES OF THE DIGESTIVE SYSTEM: Chronic | Status: ACTIVE | Noted: 2023-06-19

## 2023-06-28 PROBLEM — R79.89 OTHER SPECIFIED ABNORMAL FINDINGS OF BLOOD CHEMISTRY: Chronic | Status: ACTIVE | Noted: 2023-06-19

## 2023-07-10 ENCOUNTER — OUTPATIENT (OUTPATIENT)
Dept: INPATIENT UNIT | Facility: HOSPITAL | Age: 53
LOS: 1 days | Discharge: HOME | End: 2023-07-10
Payer: COMMERCIAL

## 2023-07-10 ENCOUNTER — TRANSCRIPTION ENCOUNTER (OUTPATIENT)
Age: 53
End: 2023-07-10

## 2023-07-10 VITALS
HEART RATE: 61 BPM | OXYGEN SATURATION: 100 % | RESPIRATION RATE: 17 BRPM | TEMPERATURE: 97 F | HEIGHT: 67 IN | WEIGHT: 188.05 LBS | DIASTOLIC BLOOD PRESSURE: 73 MMHG | SYSTOLIC BLOOD PRESSURE: 135 MMHG

## 2023-07-10 VITALS — DIASTOLIC BLOOD PRESSURE: 62 MMHG | RESPIRATION RATE: 17 BRPM | HEART RATE: 59 BPM | SYSTOLIC BLOOD PRESSURE: 111 MMHG

## 2023-07-10 DIAGNOSIS — Z98.890 OTHER SPECIFIED POSTPROCEDURAL STATES: Chronic | ICD-10-CM

## 2023-07-10 DIAGNOSIS — K66.0 PERITONEAL ADHESIONS (POSTPROCEDURAL) (POSTINFECTION): ICD-10-CM

## 2023-07-10 DIAGNOSIS — Z79.4 LONG TERM (CURRENT) USE OF INSULIN: ICD-10-CM

## 2023-07-10 DIAGNOSIS — Z79.899 OTHER LONG TERM (CURRENT) DRUG THERAPY: ICD-10-CM

## 2023-07-10 DIAGNOSIS — K80.20 CALCULUS OF GALLBLADDER WITHOUT CHOLECYSTITIS WITHOUT OBSTRUCTION: ICD-10-CM

## 2023-07-10 DIAGNOSIS — E11.9 TYPE 2 DIABETES MELLITUS WITHOUT COMPLICATIONS: ICD-10-CM

## 2023-07-10 DIAGNOSIS — K21.9 GASTRO-ESOPHAGEAL REFLUX DISEASE WITHOUT ESOPHAGITIS: ICD-10-CM

## 2023-07-10 DIAGNOSIS — K80.10 CALCULUS OF GALLBLADDER WITH CHRONIC CHOLECYSTITIS WITHOUT OBSTRUCTION: ICD-10-CM

## 2023-07-10 DIAGNOSIS — K81.2 ACUTE CHOLECYSTITIS WITH CHRONIC CHOLECYSTITIS: ICD-10-CM

## 2023-07-10 DIAGNOSIS — I10 ESSENTIAL (PRIMARY) HYPERTENSION: ICD-10-CM

## 2023-07-10 LAB — GLUCOSE BLDC GLUCOMTR-MCNC: 129 MG/DL — HIGH (ref 70–99)

## 2023-07-10 PROCEDURE — C1889: CPT

## 2023-07-10 PROCEDURE — 82962 GLUCOSE BLOOD TEST: CPT

## 2023-07-10 PROCEDURE — 88304 TISSUE EXAM BY PATHOLOGIST: CPT | Mod: 26

## 2023-07-10 PROCEDURE — 88304 TISSUE EXAM BY PATHOLOGIST: CPT

## 2023-07-10 RX ORDER — ONDANSETRON 8 MG/1
4 TABLET, FILM COATED ORAL ONCE
Refills: 0 | Status: DISCONTINUED | OUTPATIENT
Start: 2023-07-10 | End: 2023-07-10

## 2023-07-10 RX ORDER — PANTOPRAZOLE SODIUM 20 MG/1
0 TABLET, DELAYED RELEASE ORAL
Qty: 0 | Refills: 0 | DISCHARGE

## 2023-07-10 RX ORDER — HYDROCHLOROTHIAZIDE 25 MG
0 TABLET ORAL
Qty: 0 | Refills: 0 | DISCHARGE

## 2023-07-10 RX ORDER — INSULIN DEGLUDEC 100 U/ML
10 INJECTION, SOLUTION SUBCUTANEOUS
Refills: 0 | DISCHARGE

## 2023-07-10 RX ORDER — LOSARTAN POTASSIUM 100 MG/1
0 TABLET, FILM COATED ORAL
Qty: 0 | Refills: 0 | DISCHARGE

## 2023-07-10 RX ORDER — SODIUM CHLORIDE 9 MG/ML
1000 INJECTION, SOLUTION INTRAVENOUS
Refills: 0 | Status: DISCONTINUED | OUTPATIENT
Start: 2023-07-10 | End: 2023-07-10

## 2023-07-10 RX ORDER — ACETAMINOPHEN 500 MG
650 TABLET ORAL ONCE
Refills: 0 | Status: COMPLETED | OUTPATIENT
Start: 2023-07-10 | End: 2023-07-10

## 2023-07-10 RX ORDER — AMLODIPINE AND ATORVASTATIN 5; 20 MG/1; MG/1
0 TABLET, FILM COATED ORAL
Qty: 0 | Refills: 0 | DISCHARGE

## 2023-07-10 RX ORDER — OXYCODONE HYDROCHLORIDE 5 MG/1
1 TABLET ORAL
Qty: 12 | Refills: 0
Start: 2023-07-10 | End: 2023-07-12

## 2023-07-10 RX ORDER — FERROUS SULFATE 325(65) MG
1 TABLET ORAL
Refills: 0 | DISCHARGE

## 2023-07-10 RX ORDER — EMPAGLIFLOZIN 10 MG/1
0 TABLET, FILM COATED ORAL
Qty: 0 | Refills: 1 | DISCHARGE

## 2023-07-10 RX ORDER — SITAGLIPTIN AND METFORMIN HYDROCHLORIDE 500; 50 MG/1; MG/1
0 TABLET, FILM COATED ORAL
Qty: 0 | Refills: 1 | DISCHARGE

## 2023-07-10 RX ORDER — RANOLAZINE 500 MG/1
0 TABLET, FILM COATED, EXTENDED RELEASE ORAL
Qty: 0 | Refills: 0 | DISCHARGE

## 2023-07-10 RX ORDER — CHOLECALCIFEROL (VITAMIN D3) 125 MCG
1 CAPSULE ORAL
Refills: 0 | DISCHARGE

## 2023-07-10 RX ORDER — ACETAMINOPHEN 500 MG
2 TABLET ORAL
Qty: 32 | Refills: 0
Start: 2023-07-10 | End: 2023-07-13

## 2023-07-10 RX ORDER — HYDROMORPHONE HYDROCHLORIDE 2 MG/ML
0.5 INJECTION INTRAMUSCULAR; INTRAVENOUS; SUBCUTANEOUS
Refills: 0 | Status: DISCONTINUED | OUTPATIENT
Start: 2023-07-10 | End: 2023-07-10

## 2023-07-10 RX ADMIN — Medication 650 MILLIGRAM(S): at 11:31

## 2023-07-10 NOTE — ASU DISCHARGE PLAN (ADULT/PEDIATRIC) - ASU DC SPECIAL INSTRUCTIONSFT
-Diet: Continue regular diet as tolerated.  -Activity: Avoid heavy lifting or straining (anything over 10-15 pounds) for at least 6 weeks. You may ambulate and otherwise resume normal daily activities as tolerated.   -Incisions: Do not remove your dressings. You may shower and allow soap and water to rinse over incisions in 48hrs. Please avoid scrubbing the areas and do not submerge your incisions in water for at least 2 weeks.  -Medications: You may resume your home medications. You may take Tylenol 650mg every 6 hours and alternate with Ibuprofen 600mg every 8 hours for pain. A prescription has been sent to your pharmacy for Oxycodone 5mg every 6 hours only as needed for severe breakthough pain after taking Tylenol and Motrin. Please do not use this medication when driving or operating heavy machinery as it can make your drowsy.  -Follow-up: Please call the office to schedule a follow-up appointment with Dr. Call next Tuesday July 18.  -Please call the office or return to the ED with persistent fever greater than 100.4F, uncontrollable nausea/vomiting/abdominal pain, constipation, bloody bowel movements, abdominal distention, inability to tolerate oral intake.

## 2023-07-10 NOTE — ASU DISCHARGE PLAN (ADULT/PEDIATRIC) - NS MD DC FALL RISK RISK
For information on Fall & Injury Prevention, visit: [email or web address] OR  [email or web address] OR  https://www.cdc.gov/steadi/patient.html

## 2023-07-10 NOTE — ASU DISCHARGE PLAN (ADULT/PEDIATRIC) - CARE PROVIDER_API CALL
Maria Luisa Call  Surgery  [address] [place] [address]  Phone: [phone]  Fax: [phone]  Follow Up Time:

## 2023-07-10 NOTE — BRIEF OPERATIVE NOTE - OPERATION/FINDINGS
Gallbladder visualized inflamed. CAM. Cystic duct clipped and 1 endoloop. Cystic artery clipped. Cirilo applied on liver bed.

## 2023-07-10 NOTE — CHART NOTE - NSCHARTNOTEFT_GEN_A_CORE
PACU ANESTHESIA ADMISSION NOTE      Procedure: lap cholecystectomy  ____  Intubated  TV:______       Rate: ______      FiO2: ______    ___x_  Patent Airway    __x__  Full return of protective reflexes    __x__  Full recovery from anesthesia / back to baseline     Vitals:   T:   97.1    R:    18            BP:   113/59    Sat:      96          P: 105      Mental Status:  ___x_ Awake   _____ Alert   _____ Drowsy   _____ Sedated    Nausea/Vomiting:  __x__ NO  ______Yes,   See Post - Op Orders          Pain Scale (0-10):  _0____    Treatment: ____ None    ____ See Post - Op/PCA Orders    Post - Operative Fluids:   ____ Oral   ___x_ See Post - Op Orders    Plan: Discharge:   __x__Home       _____Floor     _____Critical Care    _____  Other:_________________    Comments:

## 2023-07-10 NOTE — ASU PATIENT PROFILE, ADULT - FALL HARM RISK - HARM RISK INTERVENTIONS

## 2023-07-14 LAB — SURGICAL PATHOLOGY STUDY: SIGNIFICANT CHANGE UP

## 2023-07-17 ENCOUNTER — APPOINTMENT (OUTPATIENT)
Dept: GASTROENTEROLOGY | Facility: CLINIC | Age: 53
End: 2023-07-17

## 2023-08-31 ENCOUNTER — APPOINTMENT (OUTPATIENT)
Dept: GASTROENTEROLOGY | Facility: CLINIC | Age: 53
End: 2023-08-31
Payer: COMMERCIAL

## 2023-08-31 PROCEDURE — XXXXX: CPT | Mod: 1L

## 2023-08-31 RX ORDER — CIPROFLOXACIN HYDROCHLORIDE 750 MG/1
TABLET, FILM COATED ORAL
Refills: 0 | Status: ACTIVE | COMMUNITY

## 2023-08-31 NOTE — REASON FOR VISIT
[Home] : at home, [unfilled] , at the time of the visit. [Medical Office: ([facility])___] : at the medical office located in  [FreeTextEntry1] : NP HFU POST EUS

## 2023-10-09 NOTE — DISCHARGE NOTE PROVIDER - ATTENDING DISCHARGE PHYSICAL EXAMINATION:
PHYSICAL EXAM    GEN: no distress, comfortable  PULM: normal respiration  EXT: No lower extremity edema  NEURO: A&Ox3, moving all extremities Opzelura Counseling:  I discussed with the patient the risks of Opzelura including but not limited to nasopharngitis, bronchitis, ear infection, eosinophila, hives, diarrhea, folliculitis, tonsillitis, and rhinorrhea.  Taken orally, this medication has been linked to serious infections; higher rate of mortality; malignancy and lymphoproliferative disorders; major adverse cardiovascular events; thrombosis; thrombocytopenia, anemia, and neutropenia; and lipid elevations.